# Patient Record
Sex: FEMALE | Race: WHITE | NOT HISPANIC OR LATINO | Employment: UNEMPLOYED | URBAN - METROPOLITAN AREA
[De-identification: names, ages, dates, MRNs, and addresses within clinical notes are randomized per-mention and may not be internally consistent; named-entity substitution may affect disease eponyms.]

---

## 2019-10-14 ENCOUNTER — OFFICE VISIT CONVERTED (OUTPATIENT)
Dept: PLASTIC SURGERY | Facility: CLINIC | Age: 74
End: 2019-10-14
Attending: PLASTIC SURGERY

## 2019-10-14 ENCOUNTER — CONVERSION ENCOUNTER (OUTPATIENT)
Dept: PLASTIC SURGERY | Facility: CLINIC | Age: 74
End: 2019-10-14

## 2019-11-05 ENCOUNTER — HOSPITAL ENCOUNTER (OUTPATIENT)
Dept: PERIOP | Facility: HOSPITAL | Age: 74
Setting detail: HOSPITAL OUTPATIENT SURGERY
Discharge: HOME | End: 2019-11-05
Attending: PLASTIC SURGERY

## 2019-11-11 ENCOUNTER — OFFICE VISIT CONVERTED (OUTPATIENT)
Dept: PLASTIC SURGERY | Facility: CLINIC | Age: 74
End: 2019-11-11
Attending: PLASTIC SURGERY

## 2019-11-18 ENCOUNTER — OFFICE VISIT CONVERTED (OUTPATIENT)
Dept: PLASTIC SURGERY | Facility: CLINIC | Age: 74
End: 2019-11-18
Attending: PLASTIC SURGERY

## 2019-12-20 ENCOUNTER — CONVERSION ENCOUNTER (OUTPATIENT)
Dept: PLASTIC SURGERY | Facility: CLINIC | Age: 74
End: 2019-12-20

## 2019-12-20 ENCOUNTER — OFFICE VISIT CONVERTED (OUTPATIENT)
Dept: PLASTIC SURGERY | Facility: CLINIC | Age: 74
End: 2019-12-20
Attending: PLASTIC SURGERY

## 2021-05-15 VITALS
OXYGEN SATURATION: 96 % | WEIGHT: 176 LBS | SYSTOLIC BLOOD PRESSURE: 152 MMHG | BODY MASS INDEX: 32.39 KG/M2 | HEIGHT: 62 IN | DIASTOLIC BLOOD PRESSURE: 81 MMHG | HEART RATE: 80 BPM

## 2021-05-15 VITALS
BODY MASS INDEX: 32.46 KG/M2 | DIASTOLIC BLOOD PRESSURE: 82 MMHG | HEART RATE: 74 BPM | WEIGHT: 176.37 LBS | OXYGEN SATURATION: 98 % | HEIGHT: 62 IN | SYSTOLIC BLOOD PRESSURE: 147 MMHG

## 2021-05-15 VITALS
BODY MASS INDEX: 32.39 KG/M2 | SYSTOLIC BLOOD PRESSURE: 151 MMHG | HEART RATE: 83 BPM | DIASTOLIC BLOOD PRESSURE: 84 MMHG | HEIGHT: 62 IN | WEIGHT: 176 LBS | OXYGEN SATURATION: 96 %

## 2021-05-15 VITALS
HEART RATE: 81 BPM | HEIGHT: 62 IN | BODY MASS INDEX: 32.04 KG/M2 | OXYGEN SATURATION: 96 % | SYSTOLIC BLOOD PRESSURE: 159 MMHG | DIASTOLIC BLOOD PRESSURE: 82 MMHG | WEIGHT: 174.12 LBS

## 2022-02-03 ENCOUNTER — TELEPHONE (OUTPATIENT)
Dept: UROLOGY | Facility: CLINIC | Age: 77
End: 2022-02-03

## 2022-02-03 ENCOUNTER — OFFICE VISIT (OUTPATIENT)
Dept: FAMILY MEDICINE CLINIC | Facility: CLINIC | Age: 77
End: 2022-02-03

## 2022-02-03 VITALS
DIASTOLIC BLOOD PRESSURE: 102 MMHG | BODY MASS INDEX: 32.33 KG/M2 | HEART RATE: 77 BPM | RESPIRATION RATE: 20 BRPM | OXYGEN SATURATION: 97 % | TEMPERATURE: 97.2 F | SYSTOLIC BLOOD PRESSURE: 208 MMHG | WEIGHT: 175.7 LBS | HEIGHT: 62 IN

## 2022-02-03 DIAGNOSIS — I10 PRIMARY HYPERTENSION: ICD-10-CM

## 2022-02-03 DIAGNOSIS — Z86.010 HISTORY OF COLON POLYPS: ICD-10-CM

## 2022-02-03 DIAGNOSIS — Z00.00 MEDICARE ANNUAL WELLNESS VISIT, INITIAL: Primary | ICD-10-CM

## 2022-02-03 DIAGNOSIS — Z11.59 ENCOUNTER FOR HEPATITIS C SCREENING TEST FOR LOW RISK PATIENT: ICD-10-CM

## 2022-02-03 PROBLEM — C44.91 BASAL CELL CARCINOMA: Status: ACTIVE | Noted: 2022-02-03

## 2022-02-03 PROBLEM — M19.90 ARTHRITIS: Status: ACTIVE | Noted: 2022-02-03

## 2022-02-03 LAB
ALBUMIN SERPL-MCNC: 4.3 G/DL (ref 3.5–5.2)
ALBUMIN/GLOB SERPL: 1.5 G/DL
ALP SERPL-CCNC: 94 U/L (ref 39–117)
ALT SERPL W P-5'-P-CCNC: 16 U/L (ref 1–33)
ANION GAP SERPL CALCULATED.3IONS-SCNC: 10.7 MMOL/L (ref 5–15)
AST SERPL-CCNC: 18 U/L (ref 1–32)
BASOPHILS # BLD AUTO: 0.04 10*3/MM3 (ref 0–0.2)
BASOPHILS NFR BLD AUTO: 0.7 % (ref 0–1.5)
BILIRUB SERPL-MCNC: 0.3 MG/DL (ref 0–1.2)
BUN SERPL-MCNC: 13 MG/DL (ref 8–23)
BUN/CREAT SERPL: 17.8 (ref 7–25)
CALCIUM SPEC-SCNC: 9.6 MG/DL (ref 8.6–10.5)
CHLORIDE SERPL-SCNC: 104 MMOL/L (ref 98–107)
CHOLEST SERPL-MCNC: 209 MG/DL (ref 0–200)
CO2 SERPL-SCNC: 25.3 MMOL/L (ref 22–29)
CREAT SERPL-MCNC: 0.73 MG/DL (ref 0.57–1)
DEPRECATED RDW RBC AUTO: 44.4 FL (ref 37–54)
EOSINOPHIL # BLD AUTO: 0.04 10*3/MM3 (ref 0–0.4)
EOSINOPHIL NFR BLD AUTO: 0.7 % (ref 0.3–6.2)
ERYTHROCYTE [DISTWIDTH] IN BLOOD BY AUTOMATED COUNT: 13.2 % (ref 12.3–15.4)
GFR SERPL CREATININE-BSD FRML MDRD: 78 ML/MIN/1.73
GLOBULIN UR ELPH-MCNC: 2.9 GM/DL
GLUCOSE SERPL-MCNC: 98 MG/DL (ref 65–99)
HCT VFR BLD AUTO: 42 % (ref 34–46.6)
HDLC SERPL-MCNC: 54 MG/DL (ref 40–60)
HGB BLD-MCNC: 13.9 G/DL (ref 12–15.9)
IMM GRANULOCYTES # BLD AUTO: 0.02 10*3/MM3 (ref 0–0.05)
IMM GRANULOCYTES NFR BLD AUTO: 0.3 % (ref 0–0.5)
LDLC SERPL CALC-MCNC: 126 MG/DL (ref 0–100)
LDLC/HDLC SERPL: 2.27 {RATIO}
LYMPHOCYTES # BLD AUTO: 2.21 10*3/MM3 (ref 0.7–3.1)
LYMPHOCYTES NFR BLD AUTO: 38.3 % (ref 19.6–45.3)
MCH RBC QN AUTO: 30.2 PG (ref 26.6–33)
MCHC RBC AUTO-ENTMCNC: 33.1 G/DL (ref 31.5–35.7)
MCV RBC AUTO: 91.3 FL (ref 79–97)
MONOCYTES # BLD AUTO: 0.43 10*3/MM3 (ref 0.1–0.9)
MONOCYTES NFR BLD AUTO: 7.5 % (ref 5–12)
NEUTROPHILS NFR BLD AUTO: 3.03 10*3/MM3 (ref 1.7–7)
NEUTROPHILS NFR BLD AUTO: 52.5 % (ref 42.7–76)
NRBC BLD AUTO-RTO: 0 /100 WBC (ref 0–0.2)
PLATELET # BLD AUTO: 258 10*3/MM3 (ref 140–450)
PMV BLD AUTO: 9.7 FL (ref 6–12)
POTASSIUM SERPL-SCNC: 4.6 MMOL/L (ref 3.5–5.2)
PROT SERPL-MCNC: 7.2 G/DL (ref 6–8.5)
RBC # BLD AUTO: 4.6 10*6/MM3 (ref 3.77–5.28)
SODIUM SERPL-SCNC: 140 MMOL/L (ref 136–145)
TRIGL SERPL-MCNC: 163 MG/DL (ref 0–150)
TSH SERPL DL<=0.05 MIU/L-ACNC: 3.97 UIU/ML (ref 0.27–4.2)
VLDLC SERPL-MCNC: 29 MG/DL (ref 5–40)
WBC NRBC COR # BLD: 5.77 10*3/MM3 (ref 3.4–10.8)

## 2022-02-03 PROCEDURE — G0438 PPPS, INITIAL VISIT: HCPCS | Performed by: NURSE PRACTITIONER

## 2022-02-03 PROCEDURE — 99203 OFFICE O/P NEW LOW 30 MIN: CPT | Performed by: NURSE PRACTITIONER

## 2022-02-03 PROCEDURE — 80061 LIPID PANEL: CPT | Performed by: NURSE PRACTITIONER

## 2022-02-03 PROCEDURE — 80050 GENERAL HEALTH PANEL: CPT | Performed by: NURSE PRACTITIONER

## 2022-02-03 RX ORDER — LISINOPRIL AND HYDROCHLOROTHIAZIDE 20; 12.5 MG/1; MG/1
1 TABLET ORAL DAILY
Qty: 30 TABLET | Refills: 1 | Status: SHIPPED | OUTPATIENT
Start: 2022-02-03 | End: 2022-03-03 | Stop reason: SDUPTHER

## 2022-02-03 NOTE — PATIENT INSTRUCTIONS
Advance Directive    Advance directives are legal documents that let you make choices ahead of time about your health care and medical treatment in case you become unable to communicate for yourself. Advance directives are a way for you to make known your wishes to family, friends, and health care providers. This can let others know about your end-of-life care if you become unable to communicate.  Discussing and writing advance directives should happen over time rather than all at once. Advance directives can be changed depending on your situation and what you want, even after you have signed the advance directives.  There are different types of advance directives, such as:  · Medical power of .  · Living will.  · Do not resuscitate (DNR) or do not attempt resuscitation (DNAR) order.  Health care proxy and medical power of   A health care proxy is also called a health care agent. This is a person who is appointed to make medical decisions for you in cases where you are unable to make the decisions yourself. Generally, people choose someone they know well and trust to represent their preferences. Make sure to ask this person for an agreement to act as your proxy. A proxy may have to exercise judgment in the event of a medical decision for which your wishes are not known.  A medical power of  is a legal document that names your health care proxy. Depending on the laws in your state, after the document is written, it may also need to be:  · Signed.  · Notarized.  · Dated.  · Copied.  · Witnessed.  · Incorporated into your medical record.  You may also want to appoint someone to manage your money in a situation in which you are unable to do so. This is called a durable power of  for finances. It is a separate legal document from the durable power of  for health care. You may choose the same person or someone different from your health care proxy to act as your agent in money  matters.  If you do not appoint a proxy, or if there is a concern that the proxy is not acting in your best interests, a court may appoint a guardian to act on your behalf.  Living will  A living will is a set of instructions that state your wishes about medical care when you cannot express them yourself. Health care providers should keep a copy of your living will in your medical record. You may want to give a copy to family members or friends. To alert caregivers in case of an emergency, you can place a card in your wallet to let them know that you have a living will and where they can find it. A living will is used if you become:  · Terminally ill.  · Disabled.  · Unable to communicate or make decisions.  Items to consider in your living will include:  · To use or not to use life-support equipment, such as dialysis machines and breathing machines (ventilators).  · A DNR or DNAR order. This tells health care providers not to use cardiopulmonary resuscitation (CPR) if breathing or heartbeat stops.  · To use or not to use tube feeding.  · To be given or not to be given food and fluids.  · Comfort (palliative) care when the goal becomes comfort rather than a cure.  · Donation of organs and tissues.  A living will does not give instructions for distributing your money and property if you should pass away.  DNR or DNAR  A DNR or DNAR order is a request not to have CPR in the event that your heart stops beating or you stop breathing. If a DNR or DNAR order has not been made and shared, a health care provider will try to help any patient whose heart has stopped or who has stopped breathing. If you plan to have surgery, talk with your health care provider about how your DNR or DNAR order will be followed if problems occur.  What if I do not have an advance directive?  If you do not have an advance directive, some states assign family decision makers to act on your behalf based on how closely you are related to them. Each  state has its own laws about advance directives. You may want to check with your health care provider, , or state representative about the laws in your state.  Summary  · Advance directives are the legal documents that allow you to make choices ahead of time about your health care and medical treatment in case you become unable to tell others about your care.  · The process of discussing and writing advance directives should happen over time. You can change the advance directives, even after you have signed them.  · Advance directives include DNR or DNAR orders, living whitney, and designating an agent as your medical power of .  This information is not intended to replace advice given to you by your health care provider. Make sure you discuss any questions you have with your health care provider.  Document Revised: 01/28/2021 Document Reviewed: 07/16/2020  Elsevier Patient Education © 2021 [employer]

## 2022-02-03 NOTE — TELEPHONE ENCOUNTER
ATTEMPTED TO CALL PT TO [facility] [place] W/ APRIL FOR A COLON CONSULT/LMOM

## 2022-02-03 NOTE — PROGRESS NOTES
The ABCs of the Annual Wellness Visit  Initial Medicare Wellness Visit    Chief Complaint   Patient presents with   • Establish Care     HTN     Subjective   History of Present Illness:  Estelle Gutierrez is a 76 y.o. female who presents for an Initial Medicare Wellness Visit.    The following portions of the patient's history were reviewed and   updated as appropriate: allergies, current medications, past family history, past medical history, past social history, past surgical history and problem list.     Compared to one year ago, the patient feels her physical   health is the same.    Compared to one year ago, the patient feels her mental   health is the same.    Recent Hospitalizations:  She was not admitted to the hospital during the last year.       Current Medical Providers:  Patient Care Team:  Karuna Rivers APRN as PCP - General (Nurse Practitioner)    No outpatient medications prior to visit.     No facility-administered medications prior to visit.       No opioid medication identified on active medication list. I have reviewed chart for other potential  high risk medication/s and harmful drug interactions in the elderly.          Aspirin is not on active medication list.  Aspirin use is not indicated based on review of current medical condition/s. Risk of harm outweighs potential benefits.  .    Patient Active Problem List   Diagnosis   • Arthritis   • Basal cell carcinoma   • Primary hypertension     Advance Care Planning  Advance Directive is not on file.  ACP discussion was declined by the patient. Patient does not have an advance directive, information provided.    Review of Systems   Constitutional: Negative for fatigue.   Respiratory: Negative for cough and shortness of breath.    Cardiovascular: Negative for chest pain and leg swelling.   Gastrointestinal: Negative for nausea and vomiting.   Psychiatric/Behavioral: Negative for hallucinations and suicidal ideas.        Objective       Vitals:  "   22 0800 22 0809   BP: (!) 218/76 (!) 208/102   BP Location: Left arm Right arm   Patient Position: Sitting Sitting   Cuff Size: Adult Large Adult   Pulse: 77    Resp: 20    Temp: 97.2 °F (36.2 °C)    TempSrc: Tympanic    SpO2: 97%    Weight: 79.7 kg (175 lb 11.2 oz)    Height: 157.5 cm (62\")      BMI Readings from Last 1 Encounters:   22 32.14 kg/m²   BMI is above normal parameters. Recommendations include: exercise counseling and nutrition counseling    Does the patient have evidence of cognitive impairment? No    Physical Exam  Vitals reviewed.   Constitutional:       Appearance: Normal appearance. She is well-developed.   Cardiovascular:      Rate and Rhythm: Normal rate and regular rhythm.      Heart sounds: Murmur heard.       Pulmonary:      Effort: Pulmonary effort is normal.      Breath sounds: Normal breath sounds.   Musculoskeletal:      Right lower leg: No edema.      Left lower leg: No edema.   Neurological:      Mental Status: She is alert and oriented to person, place, and time.      Cranial Nerves: No cranial nerve deficit.      Motor: No weakness.   Psychiatric:         Mood and Affect: Mood and affect normal.                 HEALTH RISK ASSESSMENT    Smoking Status:  Social History     Tobacco Use   Smoking Status Former Smoker   • Packs/day: 1.00   • Years: 36.00   • Pack years: 36.00   • Types: Cigarettes   • Quit date:    • Years since quittin.1   Smokeless Tobacco Never Used     Alcohol Consumption:  Social History     Substance and Sexual Activity   Alcohol Use Not Currently     Fall Risk Screen:    JONAHADI Fall Risk Assessment was completed, and patient is at LOW risk for falls.Assessment completed on:2/3/2022    Depression Screen:   PHQ-2/PHQ-9 Depression Screening 2/3/2022   Little interest or pleasure in doing things 0   Feeling down, depressed, or hopeless 0   Total Score 0       Health Habits and Functional and Cognitive Screening:  Functional & Cognitive " Status 2/3/2022   Do you have difficulty preparing food and eating? No   Do you have difficulty bathing yourself, getting dressed or grooming yourself? No   Do you have difficulty using the toilet? No   Do you have difficulty moving around from place to place? No   Do you have trouble with steps or getting out of a bed or a chair? No   Current Diet Well Balanced Diet   Dental Exam Not up to date   Eye Exam Up to date   Exercise (times per week) 0 times per week   Current Exercises Include Walking   Do you need help using the phone?  No   Are you deaf or do you have serious difficulty hearing?  Yes   Do you need help with transportation? Yes   Do you need help shopping? No   Do you need help preparing meals?  No   Do you need help with housework?  No   Do you need help with laundry? No   Do you need help taking your medications? No   Do you need help managing money? No   Do you ever drive or ride in a car without wearing a seat belt? No       Age-appropriate Screening Schedule:  Refer to the list below for future screening recommendations based on patient's age, sex and/or medical conditions. Orders for these recommended tests are listed in the plan section. The patient has been provided with a written plan.    Health Maintenance   Topic Date Due   • TDAP/TD VACCINES (1 - Tdap) Never done   • ZOSTER VACCINE (1 of 2) Never done   • DXA SCAN  02/03/2022 (Originally 1945)   • INFLUENZA VACCINE  Completed            Assessment/Plan   CMS Preventative Services Quick Reference  Risk Factors Identified During Encounter  Cardiovascular Disease  Chronic Pain   Depression/Dysphoria  Fall Risk-High or Moderate  Immunizations Discussed/Encouraged (specific Immunizations; Tdap and Shingrix  Inactivity/Sedentary  The above risks/problems have been discussed with the patient.  Follow up actions/plans if indicated are seen below in the Assessment/Plan Section.  Pertinent information has been shared with the patient in the After  Visit Summary.    Diagnoses and all orders for this visit:    1. Medicare annual wellness visit, initial (Primary)    2. Primary hypertension  -     Comprehensive Metabolic Panel  -     CBC & Differential  -     TSH  -     Lipid Panel  -     lisinopril-hydrochlorothiazide (PRINZIDE,ZESTORETIC) 20-12.5 MG per tablet; Take 1 tablet by mouth Daily.  Dispense: 30 tablet; Refill: 1    3. Encounter for hepatitis C screening test for low risk patient  -     Hepatitis C Antibody    4. History of colon polyps  -     Ambulatory Referral to General Surgery        Follow Up:  Return in about 4 weeks (around 3/3/2022).   We will follow up in 1 month due to the elevation of blood pressure.  We will start lisinopril hydrochlorothiazide since the blood pressure is extremely high.  She will check at home with the home monitoring.  She will watch her diet with the amount of sodium.  We discussed the risk factors with high blood pressure.  An After Visit Summary and PPPS were made available to the patient.               Karuna Rivers, APRN  02/03/2022      Chief Complaint  Establish Care (HTN)    Subjective    History of Present Illness    Estelle Gutierrez presents for Annual Wellness Visit as well as for follow-up on chronic medical problems including hypertension.   She went to the eye doctor recently and they said her blood pressure was up at their appointment.  She has not been on any medications.  She states that she is not having any chest pain shortness of breath dizziness or lightheadedness noted.  She has been checking at home and it has been running 150's/70's.    She has not had any lab work done.    Result Review :              We will follow up in 1 month due to the elevation of blood pressure.  We will start lisinopril hydrochlorothiazide since the blood pressure is extremely high.  She will check at home with the home monitoring.  She will watch her diet with the amount of sodium.  We discussed the risk factors with  high blood pressure.    She does have a slt murmur and it could be related to the blood pressure and if it is still there in 1 month with better control of the BP we will do u/s of the heart.  Time with pt overall going over chart, history, adding labs and consults 32 mins total.  Karuna Rivers, APRN  02/03/2022

## 2022-02-07 NOTE — TELEPHONE ENCOUNTER
3RD ATTEMPT TO REACH PT NO ANSWER LMOM, SENDING BACK TO HUB TO NOTIFY REFERRING PROVIDER THAT WE ARE UNABLE TO REACH PT.

## 2022-03-03 ENCOUNTER — OFFICE VISIT (OUTPATIENT)
Dept: FAMILY MEDICINE CLINIC | Facility: CLINIC | Age: 77
End: 2022-03-03

## 2022-03-03 VITALS
HEART RATE: 71 BPM | SYSTOLIC BLOOD PRESSURE: 128 MMHG | DIASTOLIC BLOOD PRESSURE: 72 MMHG | HEIGHT: 62 IN | OXYGEN SATURATION: 97 % | TEMPERATURE: 97 F | WEIGHT: 173.8 LBS | RESPIRATION RATE: 16 BRPM | BODY MASS INDEX: 31.98 KG/M2

## 2022-03-03 DIAGNOSIS — E78.2 MIXED HYPERLIPIDEMIA: ICD-10-CM

## 2022-03-03 DIAGNOSIS — R01.1 MURMUR: ICD-10-CM

## 2022-03-03 DIAGNOSIS — Z78.0 POSTMENOPAUSAL: ICD-10-CM

## 2022-03-03 DIAGNOSIS — I10 PRIMARY HYPERTENSION: Primary | ICD-10-CM

## 2022-03-03 PROCEDURE — 99213 OFFICE O/P EST LOW 20 MIN: CPT | Performed by: NURSE PRACTITIONER

## 2022-03-03 RX ORDER — LISINOPRIL AND HYDROCHLOROTHIAZIDE 20; 12.5 MG/1; MG/1
1 TABLET ORAL DAILY
Qty: 90 TABLET | Refills: 0 | Status: SHIPPED | OUTPATIENT
Start: 2022-03-03 | End: 2022-05-26 | Stop reason: SDUPTHER

## 2022-03-03 NOTE — PROGRESS NOTES
"Chief Complaint  Hypertension and Follow-up    Subjective          Estelle Gutierrez presents to [facility]  History of Present Illness    She started with the treadmill 2 miles a day about 2 weeks ago to help the chol.  She is taking her meds daily.    She feels good.          Allergies  Shellfish-derived products and Other    Social History     Tobacco Use   • Smoking status: Former Smoker     Packs/day: 1.00     Years: 36.00     Pack years: 36.00     Types: Cigarettes     Quit date:      Years since quittin.1   • Smokeless tobacco: Never Used   Vaping Use   • Vaping Use: Never used   Substance Use Topics   • Alcohol use: Not Currently   • Drug use: Never       Family History   Problem Relation Age of Onset   • Stroke Mother         90 yr old   • Heart disease Mother    • Heart disease Father    • Other Father         heart attack death   • Heart disease Brother         Health Maintenance Due   Topic Date Due   • DXA SCAN  Never done   • TDAP/TD VACCINES (1 - Tdap) Never done   • ZOSTER VACCINE (1 of 2) Never done   • HEPATITIS C SCREENING  Never done        Immunization History   Administered Date(s) Administered   • COVID-19 (PFIZER) PURPLE CAP 2021, 2021, 2021       Review of Systems   Constitutional: Negative for fatigue.   Respiratory: Negative for cough and shortness of breath.    Cardiovascular: Negative for chest pain.   Gastrointestinal: Negative for diarrhea, nausea and vomiting.        Objective       Vitals:    22 1008   BP: 128/72   Pulse: 71   Resp: 16   Temp: 97 °F (36.1 °C)   SpO2: 97%   Weight: 78.8 kg (173 lb 12.8 oz)   Height: 157.5 cm (62\")       Body mass index is 31.79 kg/m².         Physical Exam  Vitals reviewed.   Constitutional:       Appearance: Normal appearance. She is well-developed.   Cardiovascular:      Rate and Rhythm: Normal rate and regular rhythm.      Heart sounds: Murmur heard.       Pulmonary:      Effort: " Pulmonary effort is normal.      Breath sounds: Normal breath sounds.   Neurological:      Mental Status: She is alert and oriented to person, place, and time.      Cranial Nerves: No cranial nerve deficit.      Motor: No weakness.   Psychiatric:         Mood and Affect: Mood and affect normal.             Result Review :     The following data was reviewed by: CECILIA Goode on 03/03/2022:    Common labs    Common Labsle 2/3/22 2/3/22 2/3/22    0831 0831 0831   Glucose  98    BUN  13    Creatinine  0.73    eGFR Non African Am  78    Sodium  140    Potassium  4.6    Chloride  104    Calcium  9.6    Albumin  4.30    Total Bilirubin  0.3    Alkaline Phosphatase  94    AST (SGOT)  18    ALT (SGPT)  16    WBC 5.77     Hemoglobin 13.9     Hematocrit 42.0     Platelets 258     Total Cholesterol   209 (A)   Triglycerides   163 (A)   HDL Cholesterol   54   LDL Cholesterol    126 (A)   (A) Abnormal value                           Assessment and Plan      Diagnoses and all orders for this visit:    1. Primary hypertension (Primary)  -     lisinopril-hydrochlorothiazide (PRINZIDE,ZESTORETIC) 20-12.5 MG per tablet; Take 1 tablet by mouth Daily.  Dispense: 90 tablet; Refill: 0    2. Postmenopausal  -     DEXA Bone Density Axial; Future    3. Murmur    4. Mixed hyperlipidemia            Follow Up     Return in about 3 months (around 6/3/2022).  We will f.u in 3 months for fasting labs and Appointment.  Cont the great work with the BP.  For the murmur we will cont to monitor as it is not as loud as before.    Patient was given instructions and counseling regarding her condition or for health maintenance advice. Please see specific information pulled into the AVS if appropriate.         CECILIA Goode  03/03/2022

## 2022-05-19 ENCOUNTER — PREP FOR SURGERY (OUTPATIENT)
Dept: OTHER | Facility: HOSPITAL | Age: 77
End: 2022-05-19

## 2022-05-19 ENCOUNTER — OFFICE VISIT (OUTPATIENT)
Dept: SURGERY | Facility: CLINIC | Age: 77
End: 2022-05-19

## 2022-05-19 VITALS — RESPIRATION RATE: 18 BRPM | WEIGHT: 174 LBS | HEIGHT: 62 IN | HEART RATE: 71 BPM | BODY MASS INDEX: 32.02 KG/M2

## 2022-05-19 DIAGNOSIS — Z86.010 HISTORY OF COLONIC POLYPS: ICD-10-CM

## 2022-05-19 DIAGNOSIS — Z12.11 SCREENING FOR MALIGNANT NEOPLASM OF COLON: Primary | ICD-10-CM

## 2022-05-19 PROBLEM — Z86.0100 HISTORY OF COLONIC POLYPS: Status: ACTIVE | Noted: 2022-05-19

## 2022-05-19 PROCEDURE — S0285 CNSLT BEFORE SCREEN COLONOSC: HCPCS | Performed by: NURSE PRACTITIONER

## 2022-05-19 RX ORDER — SODIUM CHLORIDE 0.9 % (FLUSH) 0.9 %
3 SYRINGE (ML) INJECTION EVERY 12 HOURS SCHEDULED
Status: CANCELLED | OUTPATIENT
Start: 2022-05-19

## 2022-05-19 RX ORDER — POLYETHYLENE GLYCOL 3350 17 G/17G
POWDER, FOR SOLUTION ORAL
Qty: 238 PACKET | Refills: 0 | Status: ON HOLD | OUTPATIENT
Start: 2022-05-19 | End: 2022-07-28

## 2022-05-19 RX ORDER — SODIUM CHLORIDE 0.9 % (FLUSH) 0.9 %
10 SYRINGE (ML) INJECTION AS NEEDED
Status: CANCELLED | OUTPATIENT
Start: 2022-05-19

## 2022-05-19 NOTE — PROGRESS NOTES
Chief Complaint: Colonoscopy (consult)    Subjective      Colonoscopy consultation       History of Present Illness  Estelle Gutierrez is a 76 y.o. female presents to [facility] for colonoscopy consultation.    Patient presents today on referral from Mirna De Jesus for colonoscopy consultation.    Patient denies any abdominal pain, change in bowel habit, rectal bleeding.    Patient denies any family history of colorectal cancer.    Admits to history of colonic polyps.    11/15: Colonoscopy ([facility]): Rectum - hyperplatsic    Objective     Past Medical History:   Diagnosis Date   • Arthritis    • Cataract 01/15/2022    right       Past Surgical History:   Procedure Laterality Date   • HYSTERECTOMY      total   • KNEE CARTILAGE SURGERY Left    • SKIN GRAFT  2019    nose   • TONSILLECTOMY         Outpatient Medications Marked as Taking for the 22 encounter (Office Visit) with Loly Leong APRN   Medication Sig Dispense Refill   • lisinopril-hydrochlorothiazide (PRINZIDE,ZESTORETIC) 20-12.5 MG per tablet Take 1 tablet by mouth Daily. 90 tablet 0       Allergies   Allergen Reactions   • Shellfish-Derived Products Unknown - Low Severity   • Other Rash     Fertilizer on farm        Family History   Problem Relation Age of Onset   • Stroke Mother         90 yr old   • Heart disease Mother    • Heart disease Father    • Other Father         heart attack death   • Heart disease Brother        Social History     Socioeconomic History   • Marital status:    Tobacco Use   • Smoking status: Former Smoker     Packs/day: 1.00     Years: 36.00     Pack years: 36.00     Types: Cigarettes     Quit date:      Years since quittin.4   • Smokeless tobacco: Never Used   Vaping Use   • Vaping Use: Never used   Substance and Sexual Activity   • Alcohol use: Not Currently   • Drug use: Never   • Sexual activity: Defer       Review of Systems   Constitutional: Negative for chills and  "fever.   Gastrointestinal: Negative for abdominal distention, abdominal pain, anal bleeding, blood in stool, constipation, diarrhea and rectal pain.        Vital Signs:   Pulse 71   Resp 18   Ht 157.5 cm (62\")   Wt 78.9 kg (174 lb)   BMI 31.83 kg/m²      Physical Exam  Constitutional:       Appearance: Normal appearance.   HENT:      Head: Normocephalic.   Cardiovascular:      Rate and Rhythm: Normal rate.   Pulmonary:      Effort: Pulmonary effort is normal.   Abdominal:      General: Abdomen is flat.      Palpations: Abdomen is soft.   Skin:     General: Skin is warm and dry.   Neurological:      General: No focal deficit present.      Mental Status: She is alert and oriented to person, place, and time.   Psychiatric:         Mood and Affect: Mood normal.         Thought Content: Thought content normal.          Result Review :              []  Laboratory  []  Radiology  [x]  Pathology  []  Microbiology  []  EKG/Telemetry   []  Cardiology/Vascular   [x]  Old records  Today have reviewed Dr. Newell's previous colonoscopy and pathology.     Assessment and Plan    Diagnoses and all orders for this visit:    1. Screening for malignant neoplasm of colon (Primary)    2. History of colonic polyps    Other orders  -     polyethylene glycol (MIRALAX) 17 g packet; Take as directed.  Instructions given in office.  Dispense: 238 g bottle  Dispense: 238 packet; Refill: 0    White prep    Follow Up   Return for Schedule colonoscopy with Dr. Owens on 7/21/2022 at [facility].     Hospital arrival time: 0900    Possible risks/complications, benefits, and alternatives to surgical or invasive procedure have been explained to patient and/or legal guardian.     Patient has been evaluated and can tolerate anesthesia and/or sedation. Risks, benefits, and alternatives to anesthesia and sedation have been explained to patient and/or legal guardian.  Patient verbalizes understanding is will proceed with above " plan.    Patient was given instructions and counseling regarding her condition or for health maintenance advice. Please see specific information pulled into the AVS if appropriate.

## 2022-05-20 DIAGNOSIS — I10 PRIMARY HYPERTENSION: ICD-10-CM

## 2022-05-23 RX ORDER — LISINOPRIL AND HYDROCHLOROTHIAZIDE 20; 12.5 MG/1; MG/1
TABLET ORAL
Qty: 90 TABLET | Refills: 0 | OUTPATIENT
Start: 2022-05-23

## 2022-05-26 ENCOUNTER — OFFICE VISIT (OUTPATIENT)
Dept: FAMILY MEDICINE CLINIC | Facility: CLINIC | Age: 77
End: 2022-05-26

## 2022-05-26 ENCOUNTER — HOSPITAL ENCOUNTER (OUTPATIENT)
Dept: BONE DENSITY | Facility: HOSPITAL | Age: 77
Discharge: HOME | End: 2022-05-26
Admitting: NURSE PRACTITIONER

## 2022-05-26 VITALS
HEART RATE: 72 BPM | BODY MASS INDEX: 32.41 KG/M2 | WEIGHT: 176.1 LBS | TEMPERATURE: 97 F | OXYGEN SATURATION: 96 % | SYSTOLIC BLOOD PRESSURE: 124 MMHG | RESPIRATION RATE: 20 BRPM | DIASTOLIC BLOOD PRESSURE: 64 MMHG | HEIGHT: 62 IN

## 2022-05-26 DIAGNOSIS — I10 PRIMARY HYPERTENSION: Primary | ICD-10-CM

## 2022-05-26 DIAGNOSIS — E78.2 MIXED HYPERLIPIDEMIA: ICD-10-CM

## 2022-05-26 DIAGNOSIS — Z78.0 POSTMENOPAUSAL: ICD-10-CM

## 2022-05-26 DIAGNOSIS — Z11.59 ENCOUNTER FOR HEPATITIS C SCREENING TEST FOR LOW RISK PATIENT: ICD-10-CM

## 2022-05-26 LAB
BASOPHILS # BLD AUTO: 0.04 10*3/MM3 (ref 0–0.2)
BASOPHILS NFR BLD AUTO: 0.7 % (ref 0–1.5)
DEPRECATED RDW RBC AUTO: 44.2 FL (ref 37–54)
EOSINOPHIL # BLD AUTO: 0.06 10*3/MM3 (ref 0–0.4)
EOSINOPHIL NFR BLD AUTO: 1.1 % (ref 0.3–6.2)
ERYTHROCYTE [DISTWIDTH] IN BLOOD BY AUTOMATED COUNT: 13.2 % (ref 12.3–15.4)
HCT VFR BLD AUTO: 40.9 % (ref 34–46.6)
HGB BLD-MCNC: 13.3 G/DL (ref 12–15.9)
IMM GRANULOCYTES # BLD AUTO: 0.01 10*3/MM3 (ref 0–0.05)
IMM GRANULOCYTES NFR BLD AUTO: 0.2 % (ref 0–0.5)
LYMPHOCYTES # BLD AUTO: 2.26 10*3/MM3 (ref 0.7–3.1)
LYMPHOCYTES NFR BLD AUTO: 42.2 % (ref 19.6–45.3)
MCH RBC QN AUTO: 29.6 PG (ref 26.6–33)
MCHC RBC AUTO-ENTMCNC: 32.5 G/DL (ref 31.5–35.7)
MCV RBC AUTO: 90.9 FL (ref 79–97)
MONOCYTES # BLD AUTO: 0.52 10*3/MM3 (ref 0.1–0.9)
MONOCYTES NFR BLD AUTO: 9.7 % (ref 5–12)
NEUTROPHILS NFR BLD AUTO: 2.47 10*3/MM3 (ref 1.7–7)
NEUTROPHILS NFR BLD AUTO: 46.1 % (ref 42.7–76)
NRBC BLD AUTO-RTO: 0 /100 WBC (ref 0–0.2)
PLATELET # BLD AUTO: 267 10*3/MM3 (ref 140–450)
PMV BLD AUTO: 10 FL (ref 6–12)
RBC # BLD AUTO: 4.5 10*6/MM3 (ref 3.77–5.28)
WBC NRBC COR # BLD: 5.36 10*3/MM3 (ref 3.4–10.8)

## 2022-05-26 PROCEDURE — 80053 COMPREHEN METABOLIC PANEL: CPT | Performed by: NURSE PRACTITIONER

## 2022-05-26 PROCEDURE — 84443 ASSAY THYROID STIM HORMONE: CPT | Performed by: NURSE PRACTITIONER

## 2022-05-26 PROCEDURE — 86803 HEPATITIS C AB TEST: CPT | Performed by: NURSE PRACTITIONER

## 2022-05-26 PROCEDURE — 80061 LIPID PANEL: CPT | Performed by: NURSE PRACTITIONER

## 2022-05-26 PROCEDURE — 77080 DXA BONE DENSITY AXIAL: CPT

## 2022-05-26 PROCEDURE — 99214 OFFICE O/P EST MOD 30 MIN: CPT | Performed by: NURSE PRACTITIONER

## 2022-05-26 PROCEDURE — 85025 COMPLETE CBC W/AUTO DIFF WBC: CPT | Performed by: NURSE PRACTITIONER

## 2022-05-26 PROCEDURE — 36415 COLL VENOUS BLD VENIPUNCTURE: CPT | Performed by: NURSE PRACTITIONER

## 2022-05-26 RX ORDER — LISINOPRIL AND HYDROCHLOROTHIAZIDE 20; 12.5 MG/1; MG/1
1 TABLET ORAL DAILY
Qty: 90 TABLET | Refills: 1 | Status: SHIPPED | OUTPATIENT
Start: 2022-05-26 | End: 2022-11-22

## 2022-05-26 NOTE — PROGRESS NOTES
"Chief Complaint  Hypertension    Subjective          Estelle Gutierrez presents to [facility]  History of Present Illness  She is here for her refill of her medication.   She is doing well with her blood pressure medicine.  No chest pain shortness of breath.  She has been watching her diet with her cholesterol but she is not currently taking anything for cholesterol.  She is got her bone density's test this afternoon.      Allergies  Shellfish-derived products and Other    Social History     Tobacco Use   • Smoking status: Former Smoker     Packs/day: 1.00     Years: 36.00     Pack years: 36.00     Types: Cigarettes     Quit date:      Years since quittin.4   • Smokeless tobacco: Never Used   Vaping Use   • Vaping Use: Never used   Substance Use Topics   • Alcohol use: Not Currently   • Drug use: Never       Family History   Problem Relation Age of Onset   • Stroke Mother         90 yr old   • Heart disease Mother    • Heart disease Father    • Other Father         heart attack death   • Heart disease Brother         Health Maintenance Due   Topic Date Due   • DXA SCAN  Never done   • HEPATITIS C SCREENING  Never done        Immunization History   Administered Date(s) Administered   • COVID-19 (PFIZER) PURPLE CAP 2021, 2021, 2021       Review of Systems   Constitutional: Negative for fatigue.   Respiratory: Negative for cough and shortness of breath.    Cardiovascular: Negative for chest pain.   Gastrointestinal: Negative for diarrhea, nausea and vomiting.        Objective       Vitals:    22 1001 22 1020   BP: 140/78 124/64   Pulse: 72    Resp: 20    Temp: 97 °F (36.1 °C)    SpO2: 96%    Weight: 79.9 kg (176 lb 1.6 oz)    Height: 157.5 cm (62\")        Body mass index is 32.21 kg/m².         Physical Exam  Vitals reviewed.   Constitutional:       Appearance: Normal appearance. She is well-developed.   Cardiovascular:      Rate and Rhythm: Normal " rate and regular rhythm.      Heart sounds: Murmur heard.   Pulmonary:      Effort: Pulmonary effort is normal.      Breath sounds: Normal breath sounds.   Neurological:      Mental Status: She is alert and oriented to person, place, and time.      Cranial Nerves: No cranial nerve deficit.      Motor: No weakness.   Psychiatric:         Mood and Affect: Mood and affect normal.             Result Review :     The following data was reviewed by: CECILIA Goode on 05/26/2022:    Common labs    Common Labsle 2/3/22 2/3/22 2/3/22    0831 0831 0831   Glucose  98    BUN  13    Creatinine  0.73    eGFR Non African Am  78    Sodium  140    Potassium  4.6    Chloride  104    Calcium  9.6    Albumin  4.30    Total Bilirubin  0.3    Alkaline Phosphatase  94    AST (SGOT)  18    ALT (SGPT)  16    WBC 5.77     Hemoglobin 13.9     Hematocrit 42.0     Platelets 258     Total Cholesterol   209 (A)   Triglycerides   163 (A)   HDL Cholesterol   54   LDL Cholesterol    126 (A)   (A) Abnormal value                               Assessment and Plan      Diagnoses and all orders for this visit:    1. Primary hypertension (Primary)  -     Comprehensive Metabolic Panel  -     CBC & Differential  -     TSH  -     Lipid Panel  -     lisinopril-hydrochlorothiazide (PRINZIDE,ZESTORETIC) 20-12.5 MG per tablet; Take 1 tablet by mouth Daily.  Dispense: 90 tablet; Refill: 1    2. Encounter for hepatitis C screening test for low risk patient  -     Hepatitis C antibody    3. Mixed hyperlipidemia            Follow Up     Return in about 6 months (around 11/26/2022).  Follow-up in 6 months for labs and appt. Call with any concerns or questions that you may have regarding your medications or history.    I have reviewed all medications and at this time no medications changes need to be adjusted for all chronic conditions.    Patient was given instructions and counseling regarding her condition or for health maintenance advice. Please see  specific information pulled into the AVS if appropriate.         Karuna Rivers, APRN  05/26/2022

## 2022-05-27 LAB
ALBUMIN SERPL-MCNC: 3.8 G/DL (ref 3.5–5.2)
ALBUMIN/GLOB SERPL: 1.4 G/DL
ALP SERPL-CCNC: 79 U/L (ref 39–117)
ALT SERPL W P-5'-P-CCNC: 14 U/L (ref 1–33)
ANION GAP SERPL CALCULATED.3IONS-SCNC: 12.6 MMOL/L (ref 5–15)
AST SERPL-CCNC: 19 U/L (ref 1–32)
BILIRUB SERPL-MCNC: 0.3 MG/DL (ref 0–1.2)
BUN SERPL-MCNC: 9 MG/DL (ref 8–23)
BUN/CREAT SERPL: 13.2 (ref 7–25)
CALCIUM SPEC-SCNC: 9.2 MG/DL (ref 8.6–10.5)
CHLORIDE SERPL-SCNC: 104 MMOL/L (ref 98–107)
CHOLEST SERPL-MCNC: 192 MG/DL (ref 0–200)
CO2 SERPL-SCNC: 25.4 MMOL/L (ref 22–29)
CREAT SERPL-MCNC: 0.68 MG/DL (ref 0.57–1)
EGFRCR SERPLBLD CKD-EPI 2021: 90.4 ML/MIN/1.73
GLOBULIN UR ELPH-MCNC: 2.7 GM/DL
GLUCOSE SERPL-MCNC: 91 MG/DL (ref 65–99)
HCV AB SER DONR QL: NORMAL
HDLC SERPL-MCNC: 45 MG/DL (ref 40–60)
LDLC SERPL CALC-MCNC: 108 MG/DL (ref 0–100)
LDLC/HDLC SERPL: 2.26 {RATIO}
POTASSIUM SERPL-SCNC: 4.7 MMOL/L (ref 3.5–5.2)
PROT SERPL-MCNC: 6.5 G/DL (ref 6–8.5)
SODIUM SERPL-SCNC: 142 MMOL/L (ref 136–145)
TRIGL SERPL-MCNC: 226 MG/DL (ref 0–150)
TSH SERPL DL<=0.05 MIU/L-ACNC: 2.71 UIU/ML (ref 0.27–4.2)
VLDLC SERPL-MCNC: 39 MG/DL (ref 5–40)

## 2022-05-31 ENCOUNTER — TELEPHONE (OUTPATIENT)
Dept: FAMILY MEDICINE CLINIC | Facility: CLINIC | Age: 77
End: 2022-05-31

## 2022-05-31 NOTE — TELEPHONE ENCOUNTER
----- Message from CECILIA Goode sent at 5/30/2022 10:36 PM EDT -----  Chol is doing better overall.  Recheck in Nov keep up the great work

## 2022-06-01 ENCOUNTER — TELEPHONE (OUTPATIENT)
Dept: SURGERY | Facility: CLINIC | Age: 77
End: 2022-06-01

## 2022-06-01 NOTE — TELEPHONE ENCOUNTER
Called pt to inform her that I needed to r/s her procedure that is on for 7-21 due to dr being out. R/s pt to 7-28 with a 9:30 am arrival time. Pt voiced understanding.

## 2022-07-28 ENCOUNTER — HOSPITAL ENCOUNTER (OUTPATIENT)
Facility: HOSPITAL | Age: 77
Setting detail: HOSPITAL OUTPATIENT SURGERY
Discharge: HOME | End: 2022-07-28
Attending: SURGERY | Admitting: SURGERY

## 2022-07-28 ENCOUNTER — ANESTHESIA (OUTPATIENT)
Dept: GASTROENTEROLOGY | Facility: HOSPITAL | Age: 77
End: 2022-07-28

## 2022-07-28 ENCOUNTER — ANESTHESIA EVENT (OUTPATIENT)
Dept: GASTROENTEROLOGY | Facility: HOSPITAL | Age: 77
End: 2022-07-28

## 2022-07-28 VITALS
WEIGHT: 170.19 LBS | RESPIRATION RATE: 14 BRPM | HEART RATE: 74 BPM | TEMPERATURE: 98.6 F | DIASTOLIC BLOOD PRESSURE: 59 MMHG | SYSTOLIC BLOOD PRESSURE: 116 MMHG | BODY MASS INDEX: 31.32 KG/M2 | HEIGHT: 62 IN | OXYGEN SATURATION: 95 %

## 2022-07-28 DIAGNOSIS — Z12.11 SCREENING FOR MALIGNANT NEOPLASM OF COLON: ICD-10-CM

## 2022-07-28 DIAGNOSIS — Z86.010 HISTORY OF COLONIC POLYPS: ICD-10-CM

## 2022-07-28 PROCEDURE — 25010000002 PROPOFOL 10 MG/ML EMULSION: Performed by: NURSE ANESTHETIST, CERTIFIED REGISTERED

## 2022-07-28 RX ORDER — SODIUM CHLORIDE 0.9 % (FLUSH) 0.9 %
10 SYRINGE (ML) INJECTION AS NEEDED
Status: DISCONTINUED | OUTPATIENT
Start: 2022-07-28 | End: 2022-07-28 | Stop reason: HOSPADM

## 2022-07-28 RX ORDER — LIDOCAINE HYDROCHLORIDE 20 MG/ML
INJECTION, SOLUTION EPIDURAL; INFILTRATION; INTRACAUDAL; PERINEURAL AS NEEDED
Status: DISCONTINUED | OUTPATIENT
Start: 2022-07-28 | End: 2022-07-28 | Stop reason: SURG

## 2022-07-28 RX ORDER — SODIUM CHLORIDE 0.9 % (FLUSH) 0.9 %
3 SYRINGE (ML) INJECTION EVERY 12 HOURS SCHEDULED
Status: DISCONTINUED | OUTPATIENT
Start: 2022-07-28 | End: 2022-07-28 | Stop reason: HOSPADM

## 2022-07-28 RX ORDER — SODIUM CHLORIDE, SODIUM LACTATE, POTASSIUM CHLORIDE, CALCIUM CHLORIDE 600; 310; 30; 20 MG/100ML; MG/100ML; MG/100ML; MG/100ML
30 INJECTION, SOLUTION INTRAVENOUS CONTINUOUS
Status: DISCONTINUED | OUTPATIENT
Start: 2022-07-28 | End: 2022-07-28 | Stop reason: HOSPADM

## 2022-07-28 RX ORDER — PROPOFOL 10 MG/ML
VIAL (ML) INTRAVENOUS AS NEEDED
Status: DISCONTINUED | OUTPATIENT
Start: 2022-07-28 | End: 2022-07-28 | Stop reason: SURG

## 2022-07-28 RX ADMIN — LIDOCAINE HYDROCHLORIDE 40 MG: 20 INJECTION, SOLUTION EPIDURAL; INFILTRATION; INTRACAUDAL; PERINEURAL at 10:44

## 2022-07-28 RX ADMIN — PROPOFOL 200 MCG/KG/MIN: 10 INJECTION, EMULSION INTRAVENOUS at 10:44

## 2022-07-28 RX ADMIN — SODIUM CHLORIDE, POTASSIUM CHLORIDE, SODIUM LACTATE AND CALCIUM CHLORIDE 30 ML/HR: 600; 310; 30; 20 INJECTION, SOLUTION INTRAVENOUS at 10:16

## 2022-07-28 RX ADMIN — PROPOFOL 30 MG: 10 INJECTION, EMULSION INTRAVENOUS at 10:44

## 2022-07-28 NOTE — ANESTHESIA PREPROCEDURE EVALUATION
Anesthesia Evaluation     Patient summary reviewed and Nursing notes reviewed                Airway   Mallampati: I  TM distance: >3 FB  Neck ROM: full  No difficulty expected  Dental    (+) upper dentures    Pulmonary - negative pulmonary ROS and normal exam    breath sounds clear to auscultation  Cardiovascular - normal exam    Rhythm: regular  Rate: normal    (+) hypertension, valvular problems/murmurs,       Neuro/Psych- negative ROS  GI/Hepatic/Renal/Endo - negative ROS     Musculoskeletal     Abdominal    Substance History - negative use     OB/GYN negative ob/gyn ROS         Other   arthritis,    history of cancer                    Anesthesia Plan    ASA 2     general     intravenous induction     Anesthetic plan, risks, benefits, and alternatives have been provided, discussed and informed consent has been obtained with: patient.        CODE STATUS:

## 2022-07-28 NOTE — ANESTHESIA POSTPROCEDURE EVALUATION
Patient: Estelle Gutierrez    Procedure Summary     Date: 07/28/22 Room / Location: [facility] ENDOSCOPY 1 / [facility] ENDOSCOPY    Anesthesia Start: 1044 Anesthesia Stop: 1110    Procedure: COLONOSCOPY (N/A ) Diagnosis:       Screening for malignant neoplasm of colon      History of colonic polyps      (Screening for malignant neoplasm of colon [Z12.11])      (History of colonic polyps [Z86.010])    Surgeons: Mannie Owens MD Provider: Billy Ca MD    Anesthesia Type: general ASA Status: 2          Anesthesia Type: general    Vitals  Vitals Value Taken Time   /56 07/28/22 1115   Temp 36.8 °C (98.3 °F) 07/28/22 1115   Pulse 77 07/28/22 1118   Resp 15 07/28/22 1115   SpO2 97 % 07/28/22 1118   Vitals shown include unvalidated device data.        Post Anesthesia Care and Evaluation    Patient location during evaluation: bedside  Patient participation: complete - patient participated  Level of consciousness: awake  Pain management: adequate    Airway patency: patent  Anesthetic complications: No anesthetic complications  PONV Status: none  Cardiovascular status: acceptable  Respiratory status: acceptable  Hydration status: acceptable    Comments: An Anesthesiologist personally participated in the most demanding procedures (including induction and emergence if applicable) in the anesthesia plan, monitored the course of anesthesia administration at frequent intervals and remained physically present and available for immediate diagnosis and treatment of emergencies.

## 2022-08-27 PROCEDURE — 87077 CULTURE AEROBIC IDENTIFY: CPT | Performed by: PHYSICIAN ASSISTANT

## 2022-08-27 PROCEDURE — 87186 SC STD MICRODIL/AGAR DIL: CPT | Performed by: PHYSICIAN ASSISTANT

## 2022-08-27 PROCEDURE — 87086 URINE CULTURE/COLONY COUNT: CPT | Performed by: PHYSICIAN ASSISTANT

## 2022-09-29 PROCEDURE — 87186 SC STD MICRODIL/AGAR DIL: CPT | Performed by: PHYSICIAN ASSISTANT

## 2022-09-29 PROCEDURE — 87086 URINE CULTURE/COLONY COUNT: CPT | Performed by: PHYSICIAN ASSISTANT

## 2022-09-29 PROCEDURE — 87077 CULTURE AEROBIC IDENTIFY: CPT | Performed by: PHYSICIAN ASSISTANT

## 2022-11-22 DIAGNOSIS — I10 PRIMARY HYPERTENSION: ICD-10-CM

## 2022-11-22 RX ORDER — LISINOPRIL AND HYDROCHLOROTHIAZIDE 20; 12.5 MG/1; MG/1
TABLET ORAL
Qty: 30 TABLET | Refills: 0 | Status: SHIPPED | OUTPATIENT
Start: 2022-11-22 | End: 2022-11-29

## 2022-11-29 ENCOUNTER — OFFICE VISIT (OUTPATIENT)
Dept: FAMILY MEDICINE CLINIC | Facility: CLINIC | Age: 77
End: 2022-11-29

## 2022-11-29 VITALS
TEMPERATURE: 97.6 F | RESPIRATION RATE: 16 BRPM | HEART RATE: 85 BPM | OXYGEN SATURATION: 98 % | SYSTOLIC BLOOD PRESSURE: 124 MMHG | BODY MASS INDEX: 32.5 KG/M2 | HEIGHT: 62 IN | DIASTOLIC BLOOD PRESSURE: 80 MMHG | WEIGHT: 176.6 LBS

## 2022-11-29 DIAGNOSIS — N81.10 FALLEN BLADDER: ICD-10-CM

## 2022-11-29 DIAGNOSIS — E78.2 MIXED HYPERLIPIDEMIA: ICD-10-CM

## 2022-11-29 DIAGNOSIS — N32.89 BLADDER IRRITATION: ICD-10-CM

## 2022-11-29 DIAGNOSIS — I10 PRIMARY HYPERTENSION: Primary | ICD-10-CM

## 2022-11-29 PROCEDURE — 99214 OFFICE O/P EST MOD 30 MIN: CPT | Performed by: NURSE PRACTITIONER

## 2022-11-29 RX ORDER — AMLODIPINE BESYLATE 5 MG/1
5 TABLET ORAL DAILY
Qty: 30 TABLET | Refills: 2 | Status: SHIPPED | OUTPATIENT
Start: 2022-11-29 | End: 2023-01-30 | Stop reason: SDUPTHER

## 2022-11-29 RX ORDER — HYDROCHLOROTHIAZIDE 12.5 MG/1
12.5 TABLET ORAL DAILY
Qty: 30 TABLET | Refills: 2 | Status: SHIPPED | OUTPATIENT
Start: 2022-11-29 | End: 2023-01-30 | Stop reason: SDUPTHER

## 2022-11-29 NOTE — PROGRESS NOTES
Chief Complaint  Hypertension (Wants to change BP med dry hacky cough)    Subjective          Estelle Gutierrez presents to [facility]  History of Present Illness  She is here for her refill of her medication.   She is doing well with her blood pressure medicine but is having dry cough.  No chest pain shortness of breath.  She has been watching her diet with her cholesterol but she is not currently taking anything for cholesterol.    She would like to be referred to urogynecology for bladder drop.  She said she feels the bladder as she is peeing.    Allergies  Shellfish-derived products and Other    Social History     Tobacco Use   • Smoking status: Former     Packs/day: 1.00     Years: 36.00     Pack years: 36.00     Types: Cigarettes     Quit date:      Years since quittin.9   • Smokeless tobacco: Never   Vaping Use   • Vaping Use: Never used   Substance Use Topics   • Alcohol use: Not Currently   • Drug use: Never       Family History   Problem Relation Age of Onset   • Stroke Mother         90 yr old   • Heart disease Mother    • Heart disease Father    • Other Father         heart attack death   • Heart disease Brother         Health Maintenance Due   Topic Date Due   • ZOSTER VACCINE (1 of 2) Never done        Immunization History   Administered Date(s) Administered   • COVID-19 (PFIZER) BIVALENT BOOSTER 12+YRS 2022   • COVID-19 (PFIZER) PURPLE CAP 2021, 2021, 2021   • Flublok 18+yrs 10/18/2020   • Fluzone High-Dose 65+yrs 10/10/2021, 2022       Review of Systems   Constitutional: Negative for fatigue.   Respiratory: Positive for cough. Negative for shortness of breath.    Cardiovascular: Negative for chest pain.   Gastrointestinal: Negative for diarrhea, nausea and vomiting.        Objective       Vitals:    22 1548 22 1550   BP: 154/60 124/80   Pulse: 85    Resp: 16    Temp: 97.6 °F (36.4 °C)    SpO2: 98%    Weight: 80.1 kg (176  "lb 9.6 oz)    Height: 157.5 cm (62\")        Body mass index is 32.3 kg/m².         Physical Exam  Vitals reviewed.   Constitutional:       Appearance: Normal appearance. She is well-developed.   Cardiovascular:      Rate and Rhythm: Normal rate and regular rhythm.      Heart sounds: Murmur heard.   Pulmonary:      Effort: Pulmonary effort is normal.      Breath sounds: Normal breath sounds.   Neurological:      Mental Status: She is alert and oriented to person, place, and time.      Cranial Nerves: No cranial nerve deficit.      Motor: No weakness.   Psychiatric:         Mood and Affect: Mood and affect normal.             Result Review :     The following data was reviewed by: CECILIA Goode on 11/29/2022:    Common labs    Common Labs 2/3/22 2/3/22 2/3/22 5/26/22 5/26/22 5/26/22    0831 0831 0831 1025 1025 1025   Glucose  98   91    BUN  13   9    Creatinine  0.73   0.68    eGFR Non African Am  78       Sodium  140   142    Potassium  4.6   4.7    Chloride  104   104    Calcium  9.6   9.2    Albumin  4.30   3.80    Total Bilirubin  0.3   0.3    Alkaline Phosphatase  94   79    AST (SGOT)  18   19    ALT (SGPT)  16   14    WBC 5.77   5.36     Hemoglobin 13.9   13.3     Hematocrit 42.0   40.9     Platelets 258   267     Total Cholesterol   209 (A)   192   Triglycerides   163 (A)   226 (A)   HDL Cholesterol   54   45   LDL Cholesterol    126 (A)   108 (A)   (A) Abnormal value                               Assessment and Plan      Diagnoses and all orders for this visit:    1. Primary hypertension (Primary)  -     amLODIPine (Norvasc) 5 MG tablet; Take 1 tablet by mouth Daily.  Dispense: 30 tablet; Refill: 2  -     hydroCHLOROthiazide (HYDRODIURIL) 12.5 MG tablet; Take 1 tablet by mouth Daily.  Dispense: 30 tablet; Refill: 2  -     Comprehensive Metabolic Panel; Future  -     CBC & Differential; Future  -     TSH; Future  -     Lipid Panel; Future    2. Mixed hyperlipidemia  -     Comprehensive Metabolic " Panel; Future  -     CBC & Differential; Future  -     TSH; Future  -     Lipid Panel; Future    3. Bladder irritation  -     Ambulatory Referral to Gynecologic Urology    4. Fallen bladder  -     Ambulatory Referral to Gynecologic Urology            Follow Up     Return in about 3 months (around 2/28/2023) for Medicare Wellness 2/3/23.  She will get her blood work done at the hospital this upcoming Saturday.  I will switch her to amlodipine and hydrochlorothiazide by itself.  She will take amlodipine in the evening and the hydrochlorothiazide in the morning.  Call with questions or concerns.  She will keep a log of her blood pressure for the next 2 weeks and let me know how trending.  We will follow-up in 3 months.  Patient was given instructions and counseling regarding her condition or for health maintenance advice. Please see specific information pulled into the AVS if appropriate.         Karuna Rivers, APRN  11/29/2022

## 2022-12-02 ENCOUNTER — LAB (OUTPATIENT)
Dept: LAB | Facility: HOSPITAL | Age: 77
End: 2022-12-02

## 2022-12-02 DIAGNOSIS — E78.2 MIXED HYPERLIPIDEMIA: ICD-10-CM

## 2022-12-02 DIAGNOSIS — I10 PRIMARY HYPERTENSION: ICD-10-CM

## 2022-12-02 LAB
BASOPHILS # BLD AUTO: 0.04 10*3/MM3 (ref 0–0.2)
BASOPHILS NFR BLD AUTO: 0.5 % (ref 0–1.5)
CHOLEST SERPL-MCNC: 212 MG/DL (ref 0–200)
DEPRECATED RDW RBC AUTO: 39.5 FL (ref 37–54)
EOSINOPHIL # BLD AUTO: 0.09 10*3/MM3 (ref 0–0.4)
EOSINOPHIL NFR BLD AUTO: 1.1 % (ref 0.3–6.2)
ERYTHROCYTE [DISTWIDTH] IN BLOOD BY AUTOMATED COUNT: 12.5 % (ref 12.3–15.4)
HCT VFR BLD AUTO: 39.9 % (ref 34–46.6)
HDLC SERPL-MCNC: 50 MG/DL (ref 40–60)
HGB BLD-MCNC: 13.6 G/DL (ref 12–15.9)
IMM GRANULOCYTES # BLD AUTO: 0.03 10*3/MM3 (ref 0–0.05)
IMM GRANULOCYTES NFR BLD AUTO: 0.4 % (ref 0–0.5)
LDLC SERPL CALC-MCNC: 118 MG/DL (ref 0–100)
LDLC/HDLC SERPL: 2.24 {RATIO}
LYMPHOCYTES # BLD AUTO: 2.88 10*3/MM3 (ref 0.7–3.1)
LYMPHOCYTES NFR BLD AUTO: 35.1 % (ref 19.6–45.3)
MCH RBC QN AUTO: 29.6 PG (ref 26.6–33)
MCHC RBC AUTO-ENTMCNC: 34.1 G/DL (ref 31.5–35.7)
MCV RBC AUTO: 86.7 FL (ref 79–97)
MONOCYTES # BLD AUTO: 0.61 10*3/MM3 (ref 0.1–0.9)
MONOCYTES NFR BLD AUTO: 7.4 % (ref 5–12)
NEUTROPHILS NFR BLD AUTO: 4.55 10*3/MM3 (ref 1.7–7)
NEUTROPHILS NFR BLD AUTO: 55.5 % (ref 42.7–76)
NRBC BLD AUTO-RTO: 0 /100 WBC (ref 0–0.2)
PLATELET # BLD AUTO: 290 10*3/MM3 (ref 140–450)
PMV BLD AUTO: 9.9 FL (ref 6–12)
RBC # BLD AUTO: 4.6 10*6/MM3 (ref 3.77–5.28)
TRIGL SERPL-MCNC: 250 MG/DL (ref 0–150)
TSH SERPL DL<=0.05 MIU/L-ACNC: 2.29 UIU/ML (ref 0.27–4.2)
VLDLC SERPL-MCNC: 44 MG/DL (ref 5–40)
WBC NRBC COR # BLD: 8.2 10*3/MM3 (ref 3.4–10.8)

## 2022-12-02 PROCEDURE — 80061 LIPID PANEL: CPT

## 2022-12-02 PROCEDURE — 80053 COMPREHEN METABOLIC PANEL: CPT

## 2022-12-02 PROCEDURE — 85025 COMPLETE CBC W/AUTO DIFF WBC: CPT

## 2022-12-02 PROCEDURE — 84443 ASSAY THYROID STIM HORMONE: CPT

## 2022-12-02 PROCEDURE — 36415 COLL VENOUS BLD VENIPUNCTURE: CPT

## 2022-12-03 LAB
ALBUMIN SERPL-MCNC: 4.7 G/DL (ref 3.5–5.2)
ALBUMIN/GLOB SERPL: 1.6 G/DL
ALP SERPL-CCNC: 92 U/L (ref 39–117)
ALT SERPL W P-5'-P-CCNC: 16 U/L (ref 1–33)
ANION GAP SERPL CALCULATED.3IONS-SCNC: 11.5 MMOL/L (ref 5–15)
AST SERPL-CCNC: 19 U/L (ref 1–32)
BILIRUB SERPL-MCNC: 0.4 MG/DL (ref 0–1.2)
BUN SERPL-MCNC: 10 MG/DL (ref 8–23)
BUN/CREAT SERPL: 13.3 (ref 7–25)
CALCIUM SPEC-SCNC: 9.3 MG/DL (ref 8.6–10.5)
CHLORIDE SERPL-SCNC: 97 MMOL/L (ref 98–107)
CO2 SERPL-SCNC: 27.5 MMOL/L (ref 22–29)
CREAT SERPL-MCNC: 0.75 MG/DL (ref 0.57–1)
EGFRCR SERPLBLD CKD-EPI 2021: 82.1 ML/MIN/1.73
GLOBULIN UR ELPH-MCNC: 2.9 GM/DL
GLUCOSE SERPL-MCNC: 88 MG/DL (ref 65–99)
POTASSIUM SERPL-SCNC: 3.5 MMOL/L (ref 3.5–5.2)
PROT SERPL-MCNC: 7.6 G/DL (ref 6–8.5)
SODIUM SERPL-SCNC: 136 MMOL/L (ref 136–145)

## 2022-12-26 DIAGNOSIS — I10 PRIMARY HYPERTENSION: ICD-10-CM

## 2022-12-27 RX ORDER — LISINOPRIL AND HYDROCHLOROTHIAZIDE 20; 12.5 MG/1; MG/1
TABLET ORAL
Qty: 30 TABLET | Refills: 0 | OUTPATIENT
Start: 2022-12-27

## 2023-01-26 DIAGNOSIS — I10 PRIMARY HYPERTENSION: ICD-10-CM

## 2023-01-26 RX ORDER — AMLODIPINE BESYLATE 5 MG/1
5 TABLET ORAL DAILY
Qty: 30 TABLET | Refills: 2 | OUTPATIENT
Start: 2023-01-26

## 2023-01-26 RX ORDER — HYDROCHLOROTHIAZIDE 12.5 MG/1
12.5 TABLET ORAL DAILY
Qty: 30 TABLET | Refills: 2 | OUTPATIENT
Start: 2023-01-26

## 2023-01-26 NOTE — TELEPHONE ENCOUNTER
PATIENT STATED THAT WHEN SHE IS UP ON HER FEET DURING THE DAY HER ANKLES SWELL SO SHE WANTED TO GET IT INCREASED BUT SHE WILL HAVE IT FILLED THE ORIGINAL WAY UNTIL SHE SEE YOU IN FEBRUARY

## 2023-01-26 NOTE — TELEPHONE ENCOUNTER
Relationship: Self    Best call back number: [phone]    Requested Prescriptions:   Requested Prescriptions     Pending Prescriptions Disp Refills   • hydroCHLOROthiazide (HYDRODIURIL) 12.5 MG tablet 30 tablet 2     Sig: Take 1 tablet by mouth Daily.        Pharmacy where request should be sent: [facility] [place] - [address] - [phone]  - [phone]     Additional details provided by patient: EARLIER REQUEST WAS IN ERROR AND PATIENT WAS REQUESTING AN INCREASE IN THIS MEDICATION TO REDUCE SWELLING     Does the patient have less than a 3 day supply:  [x] Yes  [] No    Would you like a call back once the refill request has been completed: [x] Yes [] No    If the office needs to give you a call back, can they leave a voicemail: [x] Yes [] No    Michele Ortega Rep   01/26/23 12:48 EST            no

## 2023-01-26 NOTE — TELEPHONE ENCOUNTER
Caller: Estelle Gutierrez    Relationship: Self    Best call back number: [phone]    Requested Prescriptions:   Requested Prescriptions     Pending Prescriptions Disp Refills   • amLODIPine (Norvasc) 5 MG tablet 30 tablet 2     Sig: Take 1 tablet by mouth Daily.        [facility] where request should be sent: [facility] [address] [address] - [phone]  - [phone] FX     Additional details provided by patient: THE PATIENT STATED HER ANKLES ARE SWELLING IF SHE IS ON HER FEET AND WOULD LIKE TO KNOW IF PCP WILL INCREASE DOSAGE    Does the patient have less than a 3 day supply:  [x] Yes  [] No    Would you like a call back once the refill request has been completed: [x] Yes [] No    If the office needs to give you a call back, can they leave a voicemail: [x] Yes [] No    Michele Prather Rep   01/26/23 10:19 EST

## 2023-01-26 NOTE — TELEPHONE ENCOUNTER
Per new refill request patient does not want this Rx refilled or does changed. Please see new refill request

## 2023-01-30 DIAGNOSIS — I10 PRIMARY HYPERTENSION: ICD-10-CM

## 2023-01-30 RX ORDER — HYDROCHLOROTHIAZIDE 12.5 MG/1
TABLET ORAL
Qty: 30 TABLET | Refills: 2 | OUTPATIENT
Start: 2023-01-30

## 2023-01-30 RX ORDER — AMLODIPINE BESYLATE 5 MG/1
TABLET ORAL
Qty: 30 TABLET | Refills: 2 | OUTPATIENT
Start: 2023-01-30

## 2023-01-30 RX ORDER — LISINOPRIL AND HYDROCHLOROTHIAZIDE 20; 12.5 MG/1; MG/1
TABLET ORAL
Qty: 30 TABLET | Refills: 0 | OUTPATIENT
Start: 2023-01-30

## 2023-01-30 RX ORDER — AMLODIPINE BESYLATE 5 MG/1
5 TABLET ORAL DAILY
Qty: 90 TABLET | Refills: 0 | Status: SHIPPED | OUTPATIENT
Start: 2023-01-30 | End: 2023-02-28 | Stop reason: SDUPTHER

## 2023-01-30 RX ORDER — HYDROCHLOROTHIAZIDE 12.5 MG/1
12.5 TABLET ORAL DAILY
Qty: 90 TABLET | Refills: 0 | Status: SHIPPED | OUTPATIENT
Start: 2023-01-30 | End: 2023-02-28 | Stop reason: SDUPTHER

## 2023-01-30 NOTE — TELEPHONE ENCOUNTER
Alanis with [facility] requesting refill for Alpdipine and hydrocholrothiazide  for 90 days due to Insurance. Patient has appt 2/28/23  Refill sent

## 2023-02-28 ENCOUNTER — OFFICE VISIT (OUTPATIENT)
Dept: FAMILY MEDICINE CLINIC | Facility: CLINIC | Age: 78
End: 2023-02-28
Payer: COMMERCIAL

## 2023-02-28 VITALS
RESPIRATION RATE: 18 BRPM | SYSTOLIC BLOOD PRESSURE: 120 MMHG | HEART RATE: 75 BPM | HEIGHT: 62 IN | BODY MASS INDEX: 32.28 KG/M2 | DIASTOLIC BLOOD PRESSURE: 82 MMHG | OXYGEN SATURATION: 97 % | TEMPERATURE: 97.4 F | WEIGHT: 175.4 LBS

## 2023-02-28 DIAGNOSIS — I10 PRIMARY HYPERTENSION: ICD-10-CM

## 2023-02-28 DIAGNOSIS — Z00.00 MEDICARE ANNUAL WELLNESS VISIT, SUBSEQUENT: Primary | ICD-10-CM

## 2023-02-28 DIAGNOSIS — E78.2 MIXED HYPERLIPIDEMIA: ICD-10-CM

## 2023-02-28 LAB
ALBUMIN SERPL-MCNC: 4 G/DL (ref 3.5–5.2)
ALBUMIN/GLOB SERPL: 1.4 G/DL
ALP SERPL-CCNC: 88 U/L (ref 39–117)
ALT SERPL W P-5'-P-CCNC: 14 U/L (ref 1–33)
ANION GAP SERPL CALCULATED.3IONS-SCNC: 7.6 MMOL/L (ref 5–15)
AST SERPL-CCNC: 21 U/L (ref 1–32)
BASOPHILS # BLD AUTO: 0.04 10*3/MM3 (ref 0–0.2)
BASOPHILS NFR BLD AUTO: 0.6 % (ref 0–1.5)
BILIRUB SERPL-MCNC: 0.3 MG/DL (ref 0–1.2)
BUN SERPL-MCNC: 11 MG/DL (ref 8–23)
BUN/CREAT SERPL: 14.7 (ref 7–25)
CALCIUM SPEC-SCNC: 9.2 MG/DL (ref 8.6–10.5)
CHLORIDE SERPL-SCNC: 104 MMOL/L (ref 98–107)
CHOLEST SERPL-MCNC: 172 MG/DL (ref 0–200)
CO2 SERPL-SCNC: 27.4 MMOL/L (ref 22–29)
CREAT SERPL-MCNC: 0.75 MG/DL (ref 0.57–1)
DEPRECATED RDW RBC AUTO: 39.6 FL (ref 37–54)
EGFRCR SERPLBLD CKD-EPI 2021: 82.1 ML/MIN/1.73
EOSINOPHIL # BLD AUTO: 0.07 10*3/MM3 (ref 0–0.4)
EOSINOPHIL NFR BLD AUTO: 1.1 % (ref 0.3–6.2)
ERYTHROCYTE [DISTWIDTH] IN BLOOD BY AUTOMATED COUNT: 12.4 % (ref 12.3–15.4)
GLOBULIN UR ELPH-MCNC: 2.8 GM/DL
GLUCOSE SERPL-MCNC: 91 MG/DL (ref 65–99)
HCT VFR BLD AUTO: 39.2 % (ref 34–46.6)
HDLC SERPL-MCNC: 43 MG/DL (ref 40–60)
HGB BLD-MCNC: 13.3 G/DL (ref 12–15.9)
IMM GRANULOCYTES # BLD AUTO: 0.01 10*3/MM3 (ref 0–0.05)
IMM GRANULOCYTES NFR BLD AUTO: 0.2 % (ref 0–0.5)
LDLC SERPL CALC-MCNC: 98 MG/DL (ref 0–100)
LDLC/HDLC SERPL: 2.17 {RATIO}
LYMPHOCYTES # BLD AUTO: 2.04 10*3/MM3 (ref 0.7–3.1)
LYMPHOCYTES NFR BLD AUTO: 31.3 % (ref 19.6–45.3)
MCH RBC QN AUTO: 29.6 PG (ref 26.6–33)
MCHC RBC AUTO-ENTMCNC: 33.9 G/DL (ref 31.5–35.7)
MCV RBC AUTO: 87.1 FL (ref 79–97)
MONOCYTES # BLD AUTO: 0.56 10*3/MM3 (ref 0.1–0.9)
MONOCYTES NFR BLD AUTO: 8.6 % (ref 5–12)
NEUTROPHILS NFR BLD AUTO: 3.79 10*3/MM3 (ref 1.7–7)
NEUTROPHILS NFR BLD AUTO: 58.2 % (ref 42.7–76)
NRBC BLD AUTO-RTO: 0 /100 WBC (ref 0–0.2)
PLATELET # BLD AUTO: 280 10*3/MM3 (ref 140–450)
PMV BLD AUTO: 10.1 FL (ref 6–12)
POTASSIUM SERPL-SCNC: 4.4 MMOL/L (ref 3.5–5.2)
PROT SERPL-MCNC: 6.8 G/DL (ref 6–8.5)
RBC # BLD AUTO: 4.5 10*6/MM3 (ref 3.77–5.28)
SODIUM SERPL-SCNC: 139 MMOL/L (ref 136–145)
TRIGL SERPL-MCNC: 179 MG/DL (ref 0–150)
TSH SERPL DL<=0.05 MIU/L-ACNC: 2.64 UIU/ML (ref 0.27–4.2)
VLDLC SERPL-MCNC: 31 MG/DL (ref 5–40)
WBC NRBC COR # BLD: 6.51 10*3/MM3 (ref 3.4–10.8)

## 2023-02-28 PROCEDURE — 99214 OFFICE O/P EST MOD 30 MIN: CPT | Performed by: NURSE PRACTITIONER

## 2023-02-28 PROCEDURE — 84443 ASSAY THYROID STIM HORMONE: CPT | Performed by: NURSE PRACTITIONER

## 2023-02-28 PROCEDURE — 80061 LIPID PANEL: CPT | Performed by: NURSE PRACTITIONER

## 2023-02-28 PROCEDURE — 1159F MED LIST DOCD IN RCRD: CPT | Performed by: NURSE PRACTITIONER

## 2023-02-28 PROCEDURE — 36415 COLL VENOUS BLD VENIPUNCTURE: CPT | Performed by: NURSE PRACTITIONER

## 2023-02-28 PROCEDURE — 1170F FXNL STATUS ASSESSED: CPT | Performed by: NURSE PRACTITIONER

## 2023-02-28 PROCEDURE — G0439 PPPS, SUBSEQ VISIT: HCPCS | Performed by: NURSE PRACTITIONER

## 2023-02-28 PROCEDURE — 85025 COMPLETE CBC W/AUTO DIFF WBC: CPT | Performed by: NURSE PRACTITIONER

## 2023-02-28 PROCEDURE — 80053 COMPREHEN METABOLIC PANEL: CPT | Performed by: NURSE PRACTITIONER

## 2023-02-28 RX ORDER — HYDROCHLOROTHIAZIDE 12.5 MG/1
TABLET ORAL
Qty: 180 TABLET | Refills: 1 | Status: SHIPPED | OUTPATIENT
Start: 2023-02-28

## 2023-02-28 RX ORDER — SENNOSIDES 8.6 MG
650 CAPSULE ORAL EVERY 8 HOURS PRN
COMMUNITY

## 2023-02-28 RX ORDER — AMLODIPINE BESYLATE 5 MG/1
5 TABLET ORAL DAILY
Qty: 90 TABLET | Refills: 1 | Status: SHIPPED | OUTPATIENT
Start: 2023-02-28

## 2023-02-28 NOTE — PROGRESS NOTES
The ABCs of the Annual Wellness Visit  Subsequent Medicare Wellness Visit    Subjective    Estelle Gutierrez is a 77 y.o. female who presents for a Subsequent Medicare Wellness Visit.    The following portions of the patient's history were reviewed and   updated as appropriate: allergies, current medications, past family history, past medical history, past social history, past surgical history and problem list.    Compared to one year ago, the patient feels her physical   health is the same.    Compared to one year ago, the patient feels her mental   health is the same.    Recent Hospitalizations:  She was not admitted to the hospital during the last year.       Current Medical Providers:  Patient Care Team:  Karuna Rivers APRN as PCP - General (Nurse Practitioner)    Outpatient Medications Prior to Visit   Medication Sig Dispense Refill   • acetaminophen (TYLENOL) 650 MG 8 hr tablet Take 1 tablet by mouth Every 8 (Eight) Hours As Needed.     • amLODIPine (Norvasc) 5 MG tablet Take 1 tablet by mouth Daily. 90 tablet 0   • hydroCHLOROthiazide (HYDRODIURIL) 12.5 MG tablet Take 1 tablet by mouth Daily. 90 tablet 0     No facility-administered medications prior to visit.       No opioid medication identified on active medication list. I have reviewed chart for other potential  high risk medication/s and harmful drug interactions in the elderly.          Aspirin is not on active medication list.  Aspirin use is not indicated based on review of current medical condition/s. Risk of harm outweighs potential benefits.  .    Patient Active Problem List   Diagnosis   • Arthritis   • Basal cell carcinoma   • Primary hypertension   • Murmur   • Screening for malignant neoplasm of colon   • History of colonic polyps     Advance Care Planning  Advance Directive is not on file.  ACP discussion was declined by the patient. Patient does not have an advance directive, information provided.     Objective    Vitals:    02/28/23  "0825 23 0832 23 0843   BP: 152/80 158/60 120/82   Pulse: 75     Resp: 18     Temp: 97.4 °F (36.3 °C)     SpO2: 97%     Weight: 79.6 kg (175 lb 6.4 oz)     Height: 157.5 cm (62\")       Estimated body mass index is 32.08 kg/m² as calculated from the following:    Height as of this encounter: 157.5 cm (62\").    Weight as of this encounter: 79.6 kg (175 lb 6.4 oz).    BMI is >= 30 and <35. (Class 1 Obesity). The following options were offered after discussion;: exercise counseling/recommendations and nutrition counseling/recommendations      Does the patient have evidence of cognitive impairment? No    Lab Results   Component Value Date    TRIG 250 (H) 2022    HDL 50 2022     (H) 2022    VLDL 44 (H) 2022        HEALTH RISK ASSESSMENT    Smoking Status:  Social History     Tobacco Use   Smoking Status Former   • Packs/day: 1.00   • Years: 36.00   • Pack years: 36.00   • Types: Cigarettes   • Quit date:    • Years since quittin.1   Smokeless Tobacco Never     Alcohol Consumption:  Social History     Substance and Sexual Activity   Alcohol Use Not Currently     Fall Risk Screen:    BERYL Fall Risk Assessment was completed, and patient is at LOW risk for falls.Assessment completed on:2023    Depression Screening:  PHQ-2/PHQ-9 Depression Screening 2023   Little Interest or Pleasure in Doing Things 0-->not at all   Feeling Down, Depressed or Hopeless 0-->not at all   PHQ-9: Brief Depression Severity Measure Score 0       Health Habits and Functional and Cognitive Screening:  Functional & Cognitive Status 2023   Do you have difficulty preparing food and eating? No   Do you have difficulty bathing yourself, getting dressed or grooming yourself? No   Do you have difficulty using the toilet? No   Do you have difficulty moving around from place to place? No   Do you have trouble with steps or getting out of a bed or a chair? No   Current Diet Well Balanced Diet "   Dental Exam Not up to date   Eye Exam Not up to date   Exercise (times per week) 5 times per week   Current Exercises Include Walking;Treadmill        Exercise Comment walking 1 mile   Do you need help using the phone?  No   Are you deaf or do you have serious difficulty hearing?  Yes   Do you need help with transportation? No   Do you need help shopping? No   Do you need help preparing meals?  No   Do you need help with housework?  No   Do you need help with laundry? No   Do you need help taking your medications? -   Do you need help managing money? No   Do you ever drive or ride in a car without wearing a seat belt? No   Have you felt unusual stress, anger or loneliness in the last month? No   Who do you live with? Spouse   If you need help, do you have trouble finding someone available to you? No   Have you been bothered in the last four weeks by sexual problems? No   Do you have difficulty concentrating, remembering or making decisions? Yes   She does not have issues remembering day-to-day and bills.  She does has sometimes of where things potentially may be over the course of a few weeks.    Age-appropriate Screening Schedule:  Refer to the list below for future screening recommendations based on patient's age, sex and/or medical conditions. Orders for these recommended tests are listed in the plan section. The patient has been provided with a written plan.    Health Maintenance   Topic Date Due   • ZOSTER VACCINE (1 of 2) 02/28/2023 (Originally 7/27/1995)   • TDAP/TD VACCINES (1 - Tdap) 05/26/2023 (Originally 7/27/1964)   • Pneumococcal Vaccine 65+ (1 - PCV) 02/28/2024 (Originally 7/27/2010)   • LIPID PANEL  12/02/2023   • ANNUAL WELLNESS VISIT  02/28/2024   • DXA SCAN  05/26/2024   • COLORECTAL CANCER SCREENING  07/28/2032   • HEPATITIS C SCREENING  Completed   • COVID-19 Vaccine  Completed   • INFLUENZA VACCINE  Completed                CMS Preventative Services Quick Reference  Risk Factors Identified  "During Encounter  Fall Risk-High or Moderate: Discussed Fall Prevention in the home  Immunizations Discussed/Encouraged: Td, Influenza, Prevnar 20 (Pneumococcal 20-valent conjugate), Shingrix and COVID19  Dental Screening Recommended  Vision Screening Recommended  The above risks/problems have been discussed with the patient.  Pertinent information has been shared with the patient in the After Visit Summary.  An After Visit Summary and PPPS were made available to the patient.    Follow Up:   Next Medicare Wellness visit to be scheduled in 1 year.       Additional E&M Note during same encounter follows:  Patient has multiple medical problems which are significant and separately identifiable that require additional work above and beyond the Medicare Wellness Visit.      Chief Complaint  Medicare Wellness-subsequent, Hypertension, and Leg Swelling (States swelling feet and ankles/)    Subjective        HPI  Estelle Gutierrez is also being seen today for   HTN: He feels her blood pressure is doing good at home.  It usually runs about 120s over the 60s.  No shortness of breath or coughing noted.  She does have increased leg swelling mainly happening on Sunday into Monday after going to [employer] eating dinner etc.  She is wanting to increase water pill just for a few days during [employer] time.  She does not drink as much fluids on Sunday and Monday as she is moving around.  LIPID: She is not taking anything for cholesterol and does not really want to take anything for cholesterol.    Review of Systems   Constitutional: Negative for fatigue.   Respiratory: Negative for cough and shortness of breath.    Cardiovascular: Positive for leg swelling. Negative for chest pain.   Gastrointestinal: Negative for nausea and vomiting.   Psychiatric/Behavioral: Negative for hallucinations and suicidal ideas.       Objective   Vital Signs:  /82   Pulse 75   Temp 97.4 °F (36.3 °C)   Resp 18   Ht 157.5 cm (62\")   Wt 79.6 kg (175 lb 6.4 " oz)   SpO2 97%   BMI 32.08 kg/m²     Physical Exam  Vitals reviewed.   Constitutional:       Appearance: Normal appearance. She is well-developed.   Cardiovascular:      Rate and Rhythm: Normal rate and regular rhythm.      Heart sounds: Murmur heard.   Pulmonary:      Effort: Pulmonary effort is normal.      Breath sounds: Normal breath sounds.   Neurological:      Mental Status: She is alert and oriented to person, place, and time.      Cranial Nerves: No cranial nerve deficit.      Motor: No weakness.   Psychiatric:         Mood and Affect: Mood and affect normal.            Common labs    Common Labs 5/26/22 5/26/22 5/26/22 12/2/22 12/2/22 12/2/22    1025 1025 1025 1352 1352 1352   Glucose  91    88   BUN  9    10   Creatinine  0.68    0.75   Sodium  142    136   Potassium  4.7    3.5   Chloride  104    97 (A)   Calcium  9.2    9.3   Albumin  3.80    4.70   Total Bilirubin  0.3    0.4   Alkaline Phosphatase  79    92   AST (SGOT)  19    19   ALT (SGPT)  14    16   WBC 5.36   8.20     Hemoglobin 13.3   13.6     Hematocrit 40.9   39.9     Platelets 267   290     Total Cholesterol   192  212 (A)    Triglycerides   226 (A)  250 (A)    HDL Cholesterol   45  50    LDL Cholesterol    108 (A)  118 (A)    (A) Abnormal value                           Assessment and Plan   Diagnoses and all orders for this visit:    1. Medicare annual wellness visit, subsequent (Primary)    2. Primary hypertension  -     Comprehensive Metabolic Panel  -     CBC & Differential  -     TSH  -     Lipid Panel  -     amLODIPine (Norvasc) 5 MG tablet; Take 1 tablet by mouth Daily.  Dispense: 90 tablet; Refill: 1  -     hydroCHLOROthiazide (HYDRODIURIL) 12.5 MG tablet; Take 1 tab in the am and 1 tab at 1pm  Dispense: 180 tablet; Refill: 1    3. Mixed hyperlipidemia  -     Comprehensive Metabolic Panel  -     CBC & Differential  -     TSH  -     Lipid Panel             Follow Up   Return in about 6 months (around 8/28/2023).   Follow-up in 6  months for labs and appt. Call with any concerns or questions that you may have regarding your medications or history.    We will adjust the BP med (water pill) due to the swelling of the legs mostly on Sun/Mon.  Check BP randomly to make sure we are not dropping low--100's/50's.  Patient was given instructions and counseling regarding her condition or for health maintenance advice. Please see specific information pulled into the AVS if appropriate.

## 2023-08-28 ENCOUNTER — OFFICE VISIT (OUTPATIENT)
Dept: FAMILY MEDICINE CLINIC | Facility: CLINIC | Age: 78
End: 2023-08-28
Payer: COMMERCIAL

## 2023-08-28 VITALS
WEIGHT: 173.4 LBS | DIASTOLIC BLOOD PRESSURE: 84 MMHG | BODY MASS INDEX: 31.91 KG/M2 | HEIGHT: 62 IN | SYSTOLIC BLOOD PRESSURE: 142 MMHG | OXYGEN SATURATION: 96 % | TEMPERATURE: 97.6 F | HEART RATE: 79 BPM | RESPIRATION RATE: 18 BRPM

## 2023-08-28 DIAGNOSIS — I10 PRIMARY HYPERTENSION: Primary | ICD-10-CM

## 2023-08-28 DIAGNOSIS — R01.1 MURMUR: ICD-10-CM

## 2023-08-28 DIAGNOSIS — E78.2 MIXED HYPERLIPIDEMIA: ICD-10-CM

## 2023-08-28 LAB
ALBUMIN SERPL-MCNC: 4.1 G/DL (ref 3.5–5.2)
ALBUMIN/GLOB SERPL: 1.2 G/DL
ALP SERPL-CCNC: 92 U/L (ref 39–117)
ALT SERPL W P-5'-P-CCNC: 16 U/L (ref 1–33)
ANION GAP SERPL CALCULATED.3IONS-SCNC: 11.8 MMOL/L (ref 5–15)
AST SERPL-CCNC: 18 U/L (ref 1–32)
BASOPHILS # BLD AUTO: 0.03 10*3/MM3 (ref 0–0.2)
BASOPHILS NFR BLD AUTO: 0.5 % (ref 0–1.5)
BILIRUB SERPL-MCNC: 0.4 MG/DL (ref 0–1.2)
BUN SERPL-MCNC: 14 MG/DL (ref 8–23)
BUN/CREAT SERPL: 19.2 (ref 7–25)
CALCIUM SPEC-SCNC: 9.3 MG/DL (ref 8.6–10.5)
CHLORIDE SERPL-SCNC: 102 MMOL/L (ref 98–107)
CHOLEST SERPL-MCNC: 172 MG/DL (ref 0–200)
CO2 SERPL-SCNC: 24.2 MMOL/L (ref 22–29)
CREAT SERPL-MCNC: 0.73 MG/DL (ref 0.57–1)
DEPRECATED RDW RBC AUTO: 42.1 FL (ref 37–54)
EGFRCR SERPLBLD CKD-EPI 2021: 84.3 ML/MIN/1.73
EOSINOPHIL # BLD AUTO: 0.08 10*3/MM3 (ref 0–0.4)
EOSINOPHIL NFR BLD AUTO: 1.2 % (ref 0.3–6.2)
ERYTHROCYTE [DISTWIDTH] IN BLOOD BY AUTOMATED COUNT: 13.5 % (ref 12.3–15.4)
GLOBULIN UR ELPH-MCNC: 3.3 GM/DL
GLUCOSE SERPL-MCNC: 108 MG/DL (ref 65–99)
HCT VFR BLD AUTO: 42.1 % (ref 34–46.6)
HDLC SERPL-MCNC: 45 MG/DL (ref 40–60)
HGB BLD-MCNC: 14.2 G/DL (ref 12–15.9)
IMM GRANULOCYTES # BLD AUTO: 0.02 10*3/MM3 (ref 0–0.05)
IMM GRANULOCYTES NFR BLD AUTO: 0.3 % (ref 0–0.5)
LDLC SERPL CALC-MCNC: 102 MG/DL (ref 0–100)
LDLC/HDLC SERPL: 2.2 {RATIO}
LYMPHOCYTES # BLD AUTO: 2.2 10*3/MM3 (ref 0.7–3.1)
LYMPHOCYTES NFR BLD AUTO: 33.2 % (ref 19.6–45.3)
MCH RBC QN AUTO: 29 PG (ref 26.6–33)
MCHC RBC AUTO-ENTMCNC: 33.7 G/DL (ref 31.5–35.7)
MCV RBC AUTO: 85.9 FL (ref 79–97)
MONOCYTES # BLD AUTO: 0.59 10*3/MM3 (ref 0.1–0.9)
MONOCYTES NFR BLD AUTO: 8.9 % (ref 5–12)
NEUTROPHILS NFR BLD AUTO: 3.7 10*3/MM3 (ref 1.7–7)
NEUTROPHILS NFR BLD AUTO: 55.9 % (ref 42.7–76)
NRBC BLD AUTO-RTO: 0 /100 WBC (ref 0–0.2)
PLATELET # BLD AUTO: 312 10*3/MM3 (ref 140–450)
PMV BLD AUTO: 10.3 FL (ref 6–12)
POTASSIUM SERPL-SCNC: 4.4 MMOL/L (ref 3.5–5.2)
PROT SERPL-MCNC: 7.4 G/DL (ref 6–8.5)
RBC # BLD AUTO: 4.9 10*6/MM3 (ref 3.77–5.28)
SODIUM SERPL-SCNC: 138 MMOL/L (ref 136–145)
TRIGL SERPL-MCNC: 139 MG/DL (ref 0–150)
TSH SERPL DL<=0.05 MIU/L-ACNC: 2.7 UIU/ML (ref 0.27–4.2)
VLDLC SERPL-MCNC: 25 MG/DL (ref 5–40)
WBC NRBC COR # BLD: 6.62 10*3/MM3 (ref 3.4–10.8)

## 2023-08-28 PROCEDURE — 1159F MED LIST DOCD IN RCRD: CPT | Performed by: NURSE PRACTITIONER

## 2023-08-28 PROCEDURE — 99213 OFFICE O/P EST LOW 20 MIN: CPT | Performed by: NURSE PRACTITIONER

## 2023-08-28 PROCEDURE — 84443 ASSAY THYROID STIM HORMONE: CPT | Performed by: NURSE PRACTITIONER

## 2023-08-28 PROCEDURE — 85025 COMPLETE CBC W/AUTO DIFF WBC: CPT | Performed by: NURSE PRACTITIONER

## 2023-08-28 PROCEDURE — 3079F DIAST BP 80-89 MM HG: CPT | Performed by: NURSE PRACTITIONER

## 2023-08-28 PROCEDURE — 80053 COMPREHEN METABOLIC PANEL: CPT | Performed by: NURSE PRACTITIONER

## 2023-08-28 PROCEDURE — 3077F SYST BP >= 140 MM HG: CPT | Performed by: NURSE PRACTITIONER

## 2023-08-28 PROCEDURE — 80061 LIPID PANEL: CPT | Performed by: NURSE PRACTITIONER

## 2023-08-28 PROCEDURE — 1160F RVW MEDS BY RX/DR IN RCRD: CPT | Performed by: NURSE PRACTITIONER

## 2023-08-28 RX ORDER — AMLODIPINE BESYLATE 5 MG/1
5 TABLET ORAL DAILY
Qty: 90 TABLET | Refills: 1 | Status: SHIPPED | OUTPATIENT
Start: 2023-08-28

## 2023-08-28 RX ORDER — HYDROCHLOROTHIAZIDE 12.5 MG/1
TABLET ORAL
Qty: 180 TABLET | Refills: 1 | Status: CANCELLED | OUTPATIENT
Start: 2023-08-28

## 2023-08-28 RX ORDER — LIFITEGRAST 50 MG/ML
1 SOLUTION/ DROPS OPHTHALMIC 2 TIMES DAILY
COMMUNITY
Start: 2023-07-13

## 2023-08-28 NOTE — PROGRESS NOTES
"Chief Complaint  Hypertension    Subjective          Estelle Gutierrez presents to [facility]  History of Present Illness  HTN:  She has been eating more salt with fresh veggie.  Norvasc at night and HCTZ in the AM sometimes.    LIPID:  No meds but has been doing treadmill but only when it is cool as it is in the garage.      Depression: Not at risk    PHQ-2 Score: 0    and 2023    Allergies  Shellfish-derived products and Other    Social History     Tobacco Use    Smoking status: Former     Packs/day: 1.00     Years: 36.00     Pack years: 36.00     Types: Cigarettes     Quit date:      Years since quittin.6    Smokeless tobacco: Never   Vaping Use    Vaping Use: Never used   Substance Use Topics    Alcohol use: Not Currently    Drug use: Never       Family History   Problem Relation Age of Onset    Stroke Mother         90 yr old    Heart disease Mother     Heart disease Father     Other Father         heart attack death    Heart disease Brother         There are no preventive care reminders to display for this patient.       Immunization History   Administered Date(s) Administered    COVID-19 (PFIZER) BIVALENT 12+YRS 2022    COVID-19 (PFIZER) Purple Cap Monovalent 2021, 2021, 2021    Flublok 18+yrs 10/18/2020    Fluzone High-Dose 65+yrs 10/10/2021, 2022       Review of Systems   Constitutional:  Negative for fatigue.   Respiratory:  Negative for cough and shortness of breath.    Cardiovascular:  Negative for chest pain.   Gastrointestinal:  Negative for diarrhea, nausea and vomiting.      Objective       Vitals:    23 0756 23 0814   BP: 160/100 142/84   Pulse: 79    Resp: 18    Temp: 97.6 øF (36.4 øC)    SpO2: 96%    Weight: 78.7 kg (173 lb 6.4 oz)    Height: 157.5 cm (62\")        Body mass index is 31.72 kg/mý.         Physical Exam  Vitals reviewed.   Constitutional:       Appearance: Normal appearance. She is well-developed. "   Cardiovascular:      Rate and Rhythm: Normal rate and regular rhythm.      Heart sounds: Normal heart sounds. Murmur heard.   Pulmonary:      Effort: Pulmonary effort is normal.      Breath sounds: Normal breath sounds.   Neurological:      Mental Status: She is alert and oriented to person, place, and time.      Cranial Nerves: No cranial nerve deficit.      Motor: No weakness.   Psychiatric:         Mood and Affect: Mood and affect normal.           Result Review :     The following data was reviewed by: CECILIA Goode on 08/28/2023:    Common Labs   Common labs          12/2/2022    13:52 2/28/2023    08:55   Common Labs   Glucose 88  91    BUN 10  11    Creatinine 0.75  0.75    Sodium 136  139    Potassium 3.5  4.4    Chloride 97  104    Calcium 9.3  9.2    Albumin 4.70  4.0    Total Bilirubin 0.4  0.3    Alkaline Phosphatase 92  88    AST (SGOT) 19  21    ALT (SGPT) 16  14    WBC 8.20  6.51    Hemoglobin 13.6  13.3    Hematocrit 39.9  39.2    Platelets 290  280    Total Cholesterol 212  172    Triglycerides 250  179    HDL Cholesterol 50  43    LDL Cholesterol  118  98                     Assessment and Plan      Diagnoses and all orders for this visit:    1. Primary hypertension (Primary)  -     amLODIPine (Norvasc) 5 MG tablet; Take 1 tablet by mouth Daily.  Dispense: 90 tablet; Refill: 1  -     Comprehensive Metabolic Panel  -     CBC & Differential  -     TSH  -     Lipid Panel    2. Murmur    3. Mixed hyperlipidemia            Follow Up     Return in about 6 months (around 2/28/2024) for Medicare Wellness 02/28/23.  Follow-up in 6 months for labs and appt. Call with any concerns or questions that you may have regarding your medications or history.  She will start taking the HCTZ in the am while your eating the fresh veggies and added salt.    I have reviewed all medications and at this time no medications changes need to be adjusted for all chronic conditions.  No change noted of the murmur  and we will cont to monitor and she declines a echo.    Patient was given instructions and counseling regarding her condition or for health maintenance advice. Please see specific information pulled into the AVS if appropriate.     Parts of this note are electronic transcriptions/translations of spoken language to printed text using the Dragon Dictation system.          Karuna Rivers, CECILIA  08/28/2023

## 2023-10-24 DIAGNOSIS — I10 PRIMARY HYPERTENSION: ICD-10-CM

## 2023-10-24 RX ORDER — HYDROCHLOROTHIAZIDE 12.5 MG/1
TABLET ORAL
Qty: 180 TABLET | Refills: 1 | Status: SHIPPED | OUTPATIENT
Start: 2023-10-24

## 2023-11-02 PROCEDURE — 87186 SC STD MICRODIL/AGAR DIL: CPT | Performed by: PHYSICIAN ASSISTANT

## 2023-11-02 PROCEDURE — 87086 URINE CULTURE/COLONY COUNT: CPT | Performed by: PHYSICIAN ASSISTANT

## 2023-11-02 PROCEDURE — 87077 CULTURE AEROBIC IDENTIFY: CPT | Performed by: PHYSICIAN ASSISTANT

## 2023-11-10 ENCOUNTER — HOSPITAL ENCOUNTER (OUTPATIENT)
Dept: GENERAL RADIOLOGY | Facility: HOSPITAL | Age: 78
Discharge: HOME | End: 2023-11-10
Payer: COMMERCIAL

## 2023-11-10 ENCOUNTER — HOSPITAL ENCOUNTER (OUTPATIENT)
Dept: GENERAL RADIOLOGY | Facility: HOSPITAL | Age: 78
Discharge: HOME | End: 2023-11-10
Admitting: NURSE PRACTITIONER
Payer: COMMERCIAL

## 2023-11-10 ENCOUNTER — OFFICE VISIT (OUTPATIENT)
Dept: FAMILY MEDICINE CLINIC | Facility: CLINIC | Age: 78
End: 2023-11-10
Payer: COMMERCIAL

## 2023-11-10 VITALS
SYSTOLIC BLOOD PRESSURE: 142 MMHG | BODY MASS INDEX: 31.56 KG/M2 | DIASTOLIC BLOOD PRESSURE: 70 MMHG | HEIGHT: 62 IN | HEART RATE: 82 BPM | WEIGHT: 171.5 LBS | TEMPERATURE: 97.7 F | OXYGEN SATURATION: 96 %

## 2023-11-10 DIAGNOSIS — M79.644 CHRONIC PAIN OF RIGHT THUMB: ICD-10-CM

## 2023-11-10 DIAGNOSIS — M54.2 NECK PAIN: ICD-10-CM

## 2023-11-10 DIAGNOSIS — G89.29 CHRONIC PAIN OF RIGHT THUMB: ICD-10-CM

## 2023-11-10 DIAGNOSIS — N39.0 URINARY TRACT INFECTION IN FEMALE: Primary | ICD-10-CM

## 2023-11-10 DIAGNOSIS — M25.511 RIGHT SHOULDER PAIN, UNSPECIFIED CHRONICITY: ICD-10-CM

## 2023-11-10 LAB
BILIRUB BLD-MCNC: NEGATIVE MG/DL
CLARITY, POC: CLEAR
COLOR UR: YELLOW
EXPIRATION DATE: NORMAL
GLUCOSE UR STRIP-MCNC: NEGATIVE MG/DL
KETONES UR QL: NEGATIVE
LEUKOCYTE EST, POC: NEGATIVE
Lab: NORMAL
NITRITE UR-MCNC: NEGATIVE MG/ML
PH UR: 7 [PH] (ref 5–8)
PROT UR STRIP-MCNC: NEGATIVE MG/DL
RBC # UR STRIP: NEGATIVE /UL
SP GR UR: 1.01 (ref 1–1.03)
UROBILINOGEN UR QL: NORMAL

## 2023-11-10 PROCEDURE — 73130 X-RAY EXAM OF HAND: CPT

## 2023-11-10 PROCEDURE — 72050 X-RAY EXAM NECK SPINE 4/5VWS: CPT

## 2023-11-10 PROCEDURE — 81003 URINALYSIS AUTO W/O SCOPE: CPT | Performed by: NURSE PRACTITIONER

## 2023-11-10 PROCEDURE — 99214 OFFICE O/P EST MOD 30 MIN: CPT | Performed by: NURSE PRACTITIONER

## 2023-11-10 RX ORDER — MELOXICAM 7.5 MG/1
7.5 TABLET ORAL DAILY
Qty: 30 TABLET | Refills: 0 | Status: SHIPPED | OUTPATIENT
Start: 2023-11-10

## 2023-11-10 RX ORDER — CEPHALEXIN 500 MG/1
500 CAPSULE ORAL 2 TIMES DAILY
Qty: 14 CAPSULE | Refills: 0 | Status: SHIPPED | OUTPATIENT
Start: 2023-11-10 | End: 2023-11-17

## 2023-11-10 NOTE — PROGRESS NOTES
"Chief Complaint  Dysuria, Shoulder Pain (right), and Wrist Pain (Right)    Subjective          Estelle Gutierrez, 78 y.o. female presents to [facility]  History of Present Illness   Patient presents today for an acute visit.  She is a patient of CECILIA Goode.  She is complaining of UTI symptoms.  She states she went to [facility] urgent care on 11/2/2023 and was treated for UTI with Macrobid.  She developed a rash and has been having indigestion.  She also is complaining that her symptoms have returned.  Urgent care did run a urine culture.  Urinalysis in the office today but she is very symptomatic.    She is also complaining that she is still having issues with right neck, shoulder, arm, wrist and thumb pain for several months but getting worse.  She states she was afraid to take anything due to her UTI symptoms.       Tobacco Use: Medium Risk (11/10/2023)    Patient History     Smoking Tobacco Use: Former     Smokeless Tobacco Use: Never     Passive Exposure: Never      Objective   Vital Signs:   /70   Pulse 82   Temp 97.7 °F (36.5 °C)   Ht 157.5 cm (62\")   Wt 77.8 kg (171 lb 8 oz)   SpO2 96%   BMI 31.37 kg/m²       Current Outpatient Medications:     acetaminophen (TYLENOL) 650 MG 8 hr tablet, Take 1 tablet by mouth Every 8 (Eight) Hours As Needed., Disp: , Rfl:     amLODIPine (Norvasc) 5 MG tablet, Take 1 tablet by mouth Daily., Disp: 90 tablet, Rfl: 1    hydroCHLOROthiazide (HYDRODIURIL) 12.5 MG tablet, TAKE ONE TABLET BY MOUTH IN THE MORNING AND 1 TABLET AT 1pm, Disp: 180 tablet, Rfl: 1    Xiidra 5 % ophthalmic solution, Administer 1 drop to both eyes 2 (Two) Times a Day., Disp: , Rfl:     cephalexin (Keflex) 500 MG capsule, Take 1 capsule by mouth 2 (Two) Times a Day for 7 days., Disp: 14 capsule, Rfl: 0    meloxicam (Mobic) 7.5 MG tablet, Take 1 tablet by mouth Daily., Disp: 30 tablet, Rfl: 0   Past Medical History:   Diagnosis Date    " Arthritis     Cancer     Cataract 01/15/2022    right    Hypertension       Physical Exam  Vitals reviewed.   Constitutional:       Appearance: Normal appearance. She is well-developed.   Neck:      Thyroid: No thyroid mass, thyromegaly or thyroid tenderness.   Cardiovascular:      Rate and Rhythm: Normal rate and regular rhythm.      Heart sounds: No murmur heard.     No friction rub. No gallop.   Pulmonary:      Effort: Pulmonary effort is normal.      Breath sounds: Normal breath sounds. No wheezing or rhonchi.   Musculoskeletal:      Right shoulder: No tenderness. Decreased range of motion.      Right hand: No swelling or tenderness.      Cervical back: No tenderness.   Lymphadenopathy:      Cervical: No cervical adenopathy.   Skin:     General: Skin is warm and dry.   Neurological:      Mental Status: She is alert and oriented to person, place, and time.      Cranial Nerves: No cranial nerve deficit.   Psychiatric:         Mood and Affect: Mood and affect normal.         Behavior: Behavior normal.         Thought Content: Thought content normal. Thought content does not include homicidal or suicidal ideation.         Judgment: Judgment normal.        Result Review :   {The following data was reviewed by CECILIA Luther             Component  Ref Range & Units 08:51  (11/10/23) 8 d ago  (11/2/23) 1 yr ago  (9/29/22) 1 yr ago  (8/27/22)   Color  Yellow, Straw, Dark Yellow, Deann Yellow Dark Yellow Dark Yellow Yellow   Clarity, UA  Clear Clear Cloudy Abnormal  Cloudy Abnormal  Cloudy Abnormal    Specific Gravity  1.005 - 1.030 1.010 1.010 1.015 1.015   pH, Urine  5.0 - 8.0 7.0 7.0 6.5 7.0   Leukocytes  Negative Negative Small (1+) Abnormal  Moderate (2+) Abnormal  Moderate (2+) Abnormal    Nitrite, UA  Negative Negative Positive Abnormal  Positive Abnormal  Positive Abnormal    Protein, POC  Negative mg/dL Negative Negative Negative Negative   Glucose, UA  Negative mg/dL Negative Negative Negative  Negative   Ketones, UA  Negative Negative Negative Negative Negative   Urobilinogen, UA  Normal, 0.2 E.U./dL Normal 0.2 E.U./dL Normal 0.2 E.U./dL   Bilirubin  Negative Negative Negative Negative Negative   Blood, UA  Negative Negative Negative Trace Abnormal  Trace Abnormal    Lot Number [id number]           Urine Culture >100,000 CFU/mL Klebsiella pneumoniae ssp pneumoniae Abnormal          Colonization of the urinary tract without infection is common. Treatment is discouraged unless the patient is symptomatic, pregnant, or undergoing an invasive urologic procedure.        Resulting Agency: [facility]     Susceptibility       Klebsiella pneumoniae ssp pneumoniae     SHANIKA     Ampicillin <=2 ug/ml Resistant     Ampicillin + Sulbactam <=2 ug/ml Susceptible     Cefazolin <=4 ug/ml Susceptible     Cefepime <=1 ug/ml Susceptible     Ceftazidime <=1 ug/ml Susceptible     Ceftriaxone <=1 ug/ml Susceptible     Gentamicin <=1 ug/ml Susceptible     Levofloxacin <=0.12 ug/ml Susceptible     Nitrofurantoin <=16 ug/ml Susceptible     Piperacillin + Tazobactam <=4 ug/ml Susceptible     Trimethoprim + Sulfamethoxazole <=20 ug/ml Susceptible               CMP   CMP          12/2/2022    13:52 2/28/2023    08:55 8/28/2023    08:22   CMP   Glucose 88  91  108    BUN 10  11  14    Creatinine 0.75  0.75  0.73    EGFR 82.1  82.1  84.3    Sodium 136  139  138    Potassium 3.5  4.4  4.4    Chloride 97  104  102    Calcium 9.3  9.2  9.3    Total Protein 7.6  6.8  7.4    Albumin 4.70  4.0  4.1    Globulin 2.9  2.8  3.3    Total Bilirubin 0.4  0.3  0.4    Alkaline Phosphatase 92  88  92    AST (SGOT) 19  21  18    ALT (SGPT) 16  14  16    Albumin/Globulin Ratio 1.6  1.4  1.2    BUN/Creatinine Ratio 13.3  14.7  19.2    Anion Gap 11.5  7.6  11.8               Assessment and Plan    Diagnoses and all orders for this visit:    1. Urinary tract infection in female (Primary)  -     POCT urinalysis dipstick, automated  -     cephalexin (Keflex) 500 MG  capsule; Take 1 capsule by mouth 2 (Two) Times a Day for 7 days.  Dispense: 14 capsule; Refill: 0    2. Neck pain  -     XR Spine Cervical Complete 4 or 5 View; Future  -     meloxicam (Mobic) 7.5 MG tablet; Take 1 tablet by mouth Daily.  Dispense: 30 tablet; Refill: 0    3. Right shoulder pain, unspecified chronicity  -     XR Spine Cervical Complete 4 or 5 View; Future  -     meloxicam (Mobic) 7.5 MG tablet; Take 1 tablet by mouth Daily.  Dispense: 30 tablet; Refill: 0    4. Chronic pain of right thumb  -     XR Hand 3+ View Right; Future  -     meloxicam (Mobic) 7.5 MG tablet; Take 1 tablet by mouth Daily.  Dispense: 30 tablet; Refill: 0    Due to her UTI symptoms, I reviewed the urine culture and will treat her with Keflex for her current UTI.  Advised to increase water intake.  I will have her get x-rays of her neck and her right hand.  I will start her on meloxicam 7.5 mg once daily.  I will have her follow-up with me in 1 month or sooner if any new or worsening of symptoms.    Follow Up   Return in about 4 weeks (around 12/8/2023) for Next scheduled follow up RT thumb, neck and shoulder pain.  Patient was given instructions and counseling regarding her condition or for health maintenance advice. Please see specific information pulled into the AVS if appropriate.     Parts of this note are electronic transcriptions/translations of spoken language to printed text using the Dragon Dictation system.      Ying Eugene, CECILIA  11/10/2023

## 2023-12-06 ENCOUNTER — OFFICE VISIT (OUTPATIENT)
Dept: FAMILY MEDICINE CLINIC | Facility: CLINIC | Age: 78
End: 2023-12-06
Payer: MEDICARE

## 2023-12-06 VITALS
HEIGHT: 62 IN | SYSTOLIC BLOOD PRESSURE: 140 MMHG | BODY MASS INDEX: 31.94 KG/M2 | RESPIRATION RATE: 18 BRPM | DIASTOLIC BLOOD PRESSURE: 78 MMHG | HEART RATE: 76 BPM | WEIGHT: 173.6 LBS | TEMPERATURE: 97.4 F | OXYGEN SATURATION: 96 %

## 2023-12-06 DIAGNOSIS — M25.531 WRIST PAIN, CHRONIC, RIGHT: Primary | ICD-10-CM

## 2023-12-06 DIAGNOSIS — M65.4 DE QUERVAIN'S DISEASE (TENOSYNOVITIS): ICD-10-CM

## 2023-12-06 DIAGNOSIS — G89.29 WRIST PAIN, CHRONIC, RIGHT: Primary | ICD-10-CM

## 2023-12-06 DIAGNOSIS — Z23 NEED FOR INFLUENZA VACCINATION: ICD-10-CM

## 2023-12-06 RX ORDER — CLOBETASOL PROPIONATE 0.5 MG/G
OINTMENT TOPICAL
COMMUNITY
Start: 2023-11-10

## 2023-12-06 NOTE — PROGRESS NOTES
"Chief Complaint  Arthritis (1 month follow up) and Wrist Pain (Right wrist pain)    Subjective          Estelle Gutierrez presents to [facility]  History of Present Illness  She saw Ying and had xrays but her wrist is still hurting.  She said her neck is doing better.        Allergies  Shellfish-derived products, Macrobid [nitrofurantoin], and Other    Social History     Tobacco Use    Smoking status: Former     Packs/day: 1.00     Years: 36.00     Additional pack years: 0.00     Total pack years: 36.00     Types: Cigarettes     Quit date:      Years since quittin.9     Passive exposure: Never    Smokeless tobacco: Never   Vaping Use    Vaping Use: Never used   Substance Use Topics    Alcohol use: Not Currently    Drug use: Never       Family History   Problem Relation Age of Onset    Stroke Mother         90 yr old    Heart disease Mother     Heart disease Father     Other Father         heart attack death    Heart disease Brother         Health Maintenance Due   Topic Date Due    TDAP/TD VACCINES (1 - Tdap) Never done        Immunization History   Administered Date(s) Administered    COVID-19 (PFIZER) BIVALENT 12+YRS 2022    COVID-19 (PFIZER) Purple Cap Monovalent 2021, 2021, 2021    Flublok 18+yrs 10/18/2020    Fluzone High-Dose 65+yrs 10/10/2021, 2022, 2023       Review of Systems   Musculoskeletal:  Positive for arthralgias and myalgias.        Objective       Vitals:    23 1431 23 1450   BP: 148/80 140/78   Pulse: 76    Resp: 18    Temp: 97.4 °F (36.3 °C)    SpO2: 96%    Weight: 78.7 kg (173 lb 9.6 oz)    Height: 157.5 cm (62\")        Body mass index is 31.75 kg/m².         Physical Exam  Constitutional:       Appearance: Normal appearance.   HENT:      Head: Normocephalic.   Pulmonary:      Effort: Pulmonary effort is normal.   Skin:     Findings: No bruising.   Neurological:      General: No focal deficit present.      " Mental Status: She is alert and oriented to person, place, and time.   Psychiatric:         Mood and Affect: Mood normal.         Behavior: Behavior normal.         Thought Content: Thought content normal.         Judgment: Judgment normal.     There is pain noted with ROM with the De Quervain noted.          Result Review :     The following data was reviewed by: CECILIA Goode on 12/06/2023:                     Assessment and Plan      Diagnoses and all orders for this visit:    1. Wrist pain, chronic, right (Primary)  -     Diclofenac Sodium (Voltaren) 1 % gel gel; Apply 4 g topically to the appropriate area as directed 4 (Four) Times a Day As Needed (hand pain).  Dispense: 100 g; Refill: 1  -     lidocaine (XYLOCAINE) 2 % jelly; Apply  topically to the appropriate area as directed 4 (Four) Times a Day As Needed for Mild Pain or Moderate Pain.  Dispense: 85 g; Refill: 0    2. De Quervain's disease (tenosynovitis)  -     Diclofenac Sodium (Voltaren) 1 % gel gel; Apply 4 g topically to the appropriate area as directed 4 (Four) Times a Day As Needed (hand pain).  Dispense: 100 g; Refill: 1  -     lidocaine (XYLOCAINE) 2 % jelly; Apply  topically to the appropriate area as directed 4 (Four) Times a Day As Needed for Mild Pain or Moderate Pain.  Dispense: 85 g; Refill: 0    3. Need for influenza vaccination  -     Fluzone High-Dose 65+yrs        I spent 31 minutes caring for Estelle on this date of service. This time includes time spent by me in the following activities:preparing for the visit, reviewing tests, obtaining and/or reviewing a separately obtained history, performing a medically appropriate examination and/or evaluation , counseling and educating the patient/family/caregiver, ordering medications, tests, or procedures, and documenting information in the medical record    Follow Up     Return if symptoms worsen or fail to improve.  I did show her in depth the xrays to do for the wrist.  We will  trial the cream above and she knows to wash her hands afterwards.  If this does not help we will do a referral to ortho.  She does not drive.    Patient was given instructions and counseling regarding her condition or for health maintenance advice. Please see specific information pulled into the AVS if appropriate.     Parts of this note are electronic transcriptions/translations of spoken language to printed text using the Dragon Dictation system.          Karuna Rivers, CECILIA  12/06/2023

## 2024-01-05 ENCOUNTER — TELEPHONE (OUTPATIENT)
Dept: FAMILY MEDICINE CLINIC | Facility: CLINIC | Age: 79
End: 2024-01-05
Payer: COMMERCIAL

## 2024-01-05 DIAGNOSIS — G89.29 WRIST PAIN, CHRONIC, RIGHT: Primary | ICD-10-CM

## 2024-01-05 DIAGNOSIS — M25.531 WRIST PAIN, CHRONIC, RIGHT: Primary | ICD-10-CM

## 2024-01-05 DIAGNOSIS — M65.4 DE QUERVAIN'S DISEASE (TENOSYNOVITIS): ICD-10-CM

## 2024-01-05 NOTE — TELEPHONE ENCOUNTER
Caller: Estelle Gutierrez    Relationship: Self    Best call back number: [phone]     What is the best time to reach you: ANYTIME     Who are you requesting to speak with (clinical staff, provider,  specific staff member): CLINICAL     What was the call regarding: PATIENT IS CALLING WITH QUESTIONS, FOR POSSIBLE INJECTIONS AND ABOUT WITH .

## 2024-01-05 NOTE — TELEPHONE ENCOUNTER
Pt states she does not have questions she would just like you to refer her to his office for injections. States her meds help but not enough and she is still in great pain.

## 2024-01-22 ENCOUNTER — OFFICE VISIT (OUTPATIENT)
Dept: ORTHOPEDIC SURGERY | Facility: CLINIC | Age: 79
End: 2024-01-22
Payer: COMMERCIAL

## 2024-01-22 VITALS
HEART RATE: 77 BPM | BODY MASS INDEX: 31.83 KG/M2 | HEIGHT: 62 IN | OXYGEN SATURATION: 99 % | DIASTOLIC BLOOD PRESSURE: 68 MMHG | WEIGHT: 173 LBS | SYSTOLIC BLOOD PRESSURE: 141 MMHG

## 2024-01-22 DIAGNOSIS — M79.641 RIGHT HAND PAIN: Primary | ICD-10-CM

## 2024-01-22 DIAGNOSIS — M18.11 ARTHRITIS OF CARPOMETACARPAL (CMC) JOINT OF RIGHT THUMB: ICD-10-CM

## 2024-01-22 PROCEDURE — 20600 DRAIN/INJ JOINT/BURSA W/O US: CPT | Performed by: ORTHOPAEDIC SURGERY

## 2024-01-22 PROCEDURE — 99203 OFFICE O/P NEW LOW 30 MIN: CPT | Performed by: ORTHOPAEDIC SURGERY

## 2024-01-22 RX ORDER — LIDOCAINE HYDROCHLORIDE 10 MG/ML
1 INJECTION, SOLUTION INFILTRATION; PERINEURAL
Status: COMPLETED | OUTPATIENT
Start: 2024-01-22 | End: 2024-01-22

## 2024-01-22 RX ORDER — TRIAMCINOLONE ACETONIDE 40 MG/ML
40 INJECTION, SUSPENSION INTRA-ARTICULAR; INTRAMUSCULAR
Status: COMPLETED | OUTPATIENT
Start: 2024-01-22 | End: 2024-01-22

## 2024-01-22 RX ADMIN — LIDOCAINE HYDROCHLORIDE 1 ML: 10 INJECTION, SOLUTION INFILTRATION; PERINEURAL at 11:00

## 2024-01-22 RX ADMIN — TRIAMCINOLONE ACETONIDE 40 MG: 40 INJECTION, SUSPENSION INTRA-ARTICULAR; INTRAMUSCULAR at 11:00

## 2024-01-22 NOTE — PROGRESS NOTES
"Chief Complaint  Initial Evaluation of the Right Hand     Subjective      Estelle Gutierrez presents to [facility] for initial evaluation of the right hand.  She has pain at the base of her thumb.  She has difficulty with  and FMC tasks. She has had no recent injury.     Allergies   Allergen Reactions    Shellfish-Derived Products Unknown - Low Severity    Macrobid [Nitrofurantoin] Rash    Other Rash     Fertilizer on farm        Social History     Socioeconomic History    Marital status:    Tobacco Use    Smoking status: Former     Packs/day: 1.00     Years: 36.00     Additional pack years: 0.00     Total pack years: 36.00     Types: Cigarettes     Quit date:      Years since quittin.0     Passive exposure: Never    Smokeless tobacco: Never   Vaping Use    Vaping Use: Never used   Substance and Sexual Activity    Alcohol use: Not Currently    Drug use: Never    Sexual activity: Not Currently     Partners: Male        I reviewed the patient's chief complaint, history of present illness, review of systems, past medical history, surgical history, family history, social history, medications, and allergy list.     Review of Systems     Constitutional: Denies fevers, chills, weight loss  Cardiovascular: Denies chest pain, shortness of breath  Skin: Denies rashes, acute skin changes  Neurologic: Denies headache, loss of consciousness        Vital Signs:   /68   Pulse 77   Ht 157.5 cm (62\")   Wt 78.5 kg (173 lb)   SpO2 99%   BMI 31.64 kg/m²          Physical Exam  General: Alert. No acute distress    Ortho Exam        RIGHT HAND Negative Compression testing/ Negative Tinels. NegativeFinkelsteins. Negative Odonnell's testing. Positive CMC grind testing. Negative Phalens. Full ROM of the hand, fingers, elbow and wrist. Negative Triggering of the digit. Sensation grossly intact to light touch, median, radial and ulnar nerve. Positive AIN, PIN and ulnar nerve motor " function intact. Axillary nerve intact. Positive pulses.        Small Joint Arthrocentesis: R thumb CMC  Consent given by: patient  Site marked: site marked  Timeout: Immediately prior to procedure a time out was called to verify the correct patient, procedure, equipment, support staff and site/side marked as required   Supporting Documentation  Indications: pain   Procedure Details  Location: thumb - R thumb CMC  Preparation: Patient was prepped and draped in the usual sterile fashion  Needle size: 25 G  Medications administered: 1 mL lidocaine 1 %; 40 mg triamcinolone acetonide 40 MG/ML  Patient tolerance: patient tolerated the procedure well with no immediate complications          Imaging Results (Most Recent)       None             Result Review :             Assessment and Plan     Diagnoses and all orders for this visit:    1. Right hand pain (Primary)    2. Arthritis of carpometacarpal (CMC) joint of right thumb        Discussed the treatment plan with the patient.     Thumb brace and CMC arthritis of the right hand.     Discussed the risks and benefits of conservative measures.  The patient expressed understanding and wished to proceed with a right hand steroid injection.  He tolerated the injection well.       Call or return if worsening symptoms.    Follow Up     PRN      Patient was given instructions and counseling regarding her condition or for health maintenance advice. Please see specific information pulled into the AVS if appropriate.     Scribed for Oleksandr Presley MD by Latonia Avila MA.  01/22/24   10:59 EST    I have personally performed the services described in this document as scribed by the above individual and it is both accurate and complete. Oleksandr Presley MD 01/22/24

## 2024-02-29 ENCOUNTER — OFFICE VISIT (OUTPATIENT)
Dept: FAMILY MEDICINE CLINIC | Facility: CLINIC | Age: 79
End: 2024-02-29
Payer: MEDICARE

## 2024-02-29 VITALS
TEMPERATURE: 97.9 F | HEART RATE: 74 BPM | WEIGHT: 168.3 LBS | HEIGHT: 62 IN | BODY MASS INDEX: 30.97 KG/M2 | OXYGEN SATURATION: 97 % | RESPIRATION RATE: 16 BRPM | DIASTOLIC BLOOD PRESSURE: 80 MMHG | SYSTOLIC BLOOD PRESSURE: 142 MMHG

## 2024-02-29 DIAGNOSIS — Z00.00 MEDICARE ANNUAL WELLNESS VISIT, SUBSEQUENT: Primary | ICD-10-CM

## 2024-02-29 DIAGNOSIS — E78.2 MIXED HYPERLIPIDEMIA: ICD-10-CM

## 2024-02-29 DIAGNOSIS — I10 PRIMARY HYPERTENSION: ICD-10-CM

## 2024-02-29 PROBLEM — E66.811 OBESITY (BMI 30.0-34.9): Status: ACTIVE | Noted: 2024-02-29

## 2024-02-29 PROBLEM — E66.9 OBESITY (BMI 30.0-34.9): Status: ACTIVE | Noted: 2024-02-29

## 2024-02-29 LAB
ALBUMIN SERPL-MCNC: 4 G/DL (ref 3.5–5.2)
ALBUMIN/GLOB SERPL: 1.3 G/DL
ALP SERPL-CCNC: 102 U/L (ref 39–117)
ALT SERPL W P-5'-P-CCNC: 15 U/L (ref 1–33)
ANION GAP SERPL CALCULATED.3IONS-SCNC: 13 MMOL/L (ref 5–15)
AST SERPL-CCNC: 15 U/L (ref 1–32)
BASOPHILS # BLD AUTO: 0.05 10*3/MM3 (ref 0–0.2)
BASOPHILS NFR BLD AUTO: 0.6 % (ref 0–1.5)
BILIRUB SERPL-MCNC: 0.5 MG/DL (ref 0–1.2)
BUN SERPL-MCNC: 13 MG/DL (ref 8–23)
BUN/CREAT SERPL: 14.9 (ref 7–25)
CALCIUM SPEC-SCNC: 9.4 MG/DL (ref 8.6–10.5)
CHLORIDE SERPL-SCNC: 98 MMOL/L (ref 98–107)
CHOLEST SERPL-MCNC: 207 MG/DL (ref 0–200)
CO2 SERPL-SCNC: 27 MMOL/L (ref 22–29)
CREAT SERPL-MCNC: 0.87 MG/DL (ref 0.57–1)
DEPRECATED RDW RBC AUTO: 43.1 FL (ref 37–54)
EGFRCR SERPLBLD CKD-EPI 2021: 68.3 ML/MIN/1.73
EOSINOPHIL # BLD AUTO: 0.06 10*3/MM3 (ref 0–0.4)
EOSINOPHIL NFR BLD AUTO: 0.8 % (ref 0.3–6.2)
ERYTHROCYTE [DISTWIDTH] IN BLOOD BY AUTOMATED COUNT: 13.6 % (ref 12.3–15.4)
GLOBULIN UR ELPH-MCNC: 3.1 GM/DL
GLUCOSE SERPL-MCNC: 105 MG/DL (ref 65–99)
HCT VFR BLD AUTO: 42.1 % (ref 34–46.6)
HDLC SERPL-MCNC: 50 MG/DL (ref 40–60)
HGB BLD-MCNC: 13.8 G/DL (ref 12–15.9)
IMM GRANULOCYTES # BLD AUTO: 0.06 10*3/MM3 (ref 0–0.05)
IMM GRANULOCYTES NFR BLD AUTO: 0.8 % (ref 0–0.5)
LDLC SERPL CALC-MCNC: 128 MG/DL (ref 0–100)
LDLC/HDLC SERPL: 2.48 {RATIO}
LYMPHOCYTES # BLD AUTO: 2.21 10*3/MM3 (ref 0.7–3.1)
LYMPHOCYTES NFR BLD AUTO: 28.2 % (ref 19.6–45.3)
MCH RBC QN AUTO: 28.7 PG (ref 26.6–33)
MCHC RBC AUTO-ENTMCNC: 32.8 G/DL (ref 31.5–35.7)
MCV RBC AUTO: 87.5 FL (ref 79–97)
MONOCYTES # BLD AUTO: 0.74 10*3/MM3 (ref 0.1–0.9)
MONOCYTES NFR BLD AUTO: 9.4 % (ref 5–12)
NEUTROPHILS NFR BLD AUTO: 4.73 10*3/MM3 (ref 1.7–7)
NEUTROPHILS NFR BLD AUTO: 60.2 % (ref 42.7–76)
NRBC BLD AUTO-RTO: 0 /100 WBC (ref 0–0.2)
PLATELET # BLD AUTO: 322 10*3/MM3 (ref 140–450)
PMV BLD AUTO: 9.8 FL (ref 6–12)
POTASSIUM SERPL-SCNC: 4.2 MMOL/L (ref 3.5–5.2)
PROT SERPL-MCNC: 7.1 G/DL (ref 6–8.5)
RBC # BLD AUTO: 4.81 10*6/MM3 (ref 3.77–5.28)
SODIUM SERPL-SCNC: 138 MMOL/L (ref 136–145)
TRIGL SERPL-MCNC: 164 MG/DL (ref 0–150)
TSH SERPL DL<=0.05 MIU/L-ACNC: 2.38 UIU/ML (ref 0.27–4.2)
VLDLC SERPL-MCNC: 29 MG/DL (ref 5–40)
WBC NRBC COR # BLD AUTO: 7.85 10*3/MM3 (ref 3.4–10.8)

## 2024-02-29 PROCEDURE — 80061 LIPID PANEL: CPT | Performed by: NURSE PRACTITIONER

## 2024-02-29 PROCEDURE — 85025 COMPLETE CBC W/AUTO DIFF WBC: CPT | Performed by: NURSE PRACTITIONER

## 2024-02-29 PROCEDURE — 80053 COMPREHEN METABOLIC PANEL: CPT | Performed by: NURSE PRACTITIONER

## 2024-02-29 PROCEDURE — 84443 ASSAY THYROID STIM HORMONE: CPT | Performed by: NURSE PRACTITIONER

## 2024-02-29 RX ORDER — HYDROCHLOROTHIAZIDE 12.5 MG/1
TABLET ORAL
Qty: 180 TABLET | Refills: 1 | Status: SHIPPED | OUTPATIENT
Start: 2024-02-29

## 2024-02-29 RX ORDER — AMLODIPINE BESYLATE 5 MG/1
5 TABLET ORAL DAILY
Qty: 90 TABLET | Refills: 1 | Status: SHIPPED | OUTPATIENT
Start: 2024-02-29

## 2024-02-29 NOTE — PROGRESS NOTES
The ABCs of the Annual Wellness Visit  Subsequent Medicare Wellness Visit    Subjective    Estelle Gutierrez is a 78 y.o. female who presents for a Subsequent Medicare Wellness Visit.    The following portions of the patient's history were reviewed and   updated as appropriate: allergies, current medications, past family history, past medical history, past social history, past surgical history, and problem list.    Compared to one year ago, the patient feels her physical   health is the same.    Compared to one year ago, the patient feels her mental   health is the same.    Recent Hospitalizations:  She was not admitted to the hospital during the last year.       Current Medical Providers:  Patient Care Team:  Karuna Rivers APRN as PCP - General (Nurse Practitioner)    Outpatient Medications Prior to Visit   Medication Sig Dispense Refill    clobetasol (TEMOVATE) 0.05 % ointment APPLY A THIN LAYER TO THE AFFECTED AREAS EVERY DAY FOR 2-3 WEEKS.      Xiidra 5 % ophthalmic solution Administer 1 drop to both eyes 2 (Two) Times a Day.      amLODIPine (Norvasc) 5 MG tablet Take 1 tablet by mouth Daily. 90 tablet 1    hydroCHLOROthiazide (HYDRODIURIL) 12.5 MG tablet TAKE ONE TABLET BY MOUTH IN THE MORNING AND 1 TABLET AT 1pm 180 tablet 1    acetaminophen (TYLENOL) 650 MG 8 hr tablet Take 1 tablet by mouth Every 8 (Eight) Hours As Needed. (Patient not taking: Reported on 2/29/2024)      Diclofenac Sodium (Voltaren) 1 % gel gel Apply 4 g topically to the appropriate area as directed 4 (Four) Times a Day As Needed (hand pain). (Patient not taking: Reported on 2/29/2024) 100 g 1    lidocaine (XYLOCAINE) 2 % jelly Apply  topically to the appropriate area as directed 4 (Four) Times a Day As Needed for Mild Pain or Moderate Pain. (Patient not taking: Reported on 2/29/2024) 85 g 0    meloxicam (Mobic) 7.5 MG tablet Take 1 tablet by mouth Daily. (Patient not taking: Reported on 2/29/2024) 30 tablet 0     No  "facility-administered medications prior to visit.       No opioid medication identified on active medication list. I have reviewed chart for other potential  high risk medication/s and harmful drug interactions in the elderly.        Aspirin is not on active medication list.  Aspirin use is not indicated based on review of current medical condition/s. Risk of harm outweighs potential benefits.  .    Patient Active Problem List   Diagnosis    Arthritis    Basal cell carcinoma    Primary hypertension    Murmur    Screening for malignant neoplasm of colon    History of colonic polyps    Obesity (BMI 30.0-34.9)     Advance Care Planning   Advance Care Planning     Advance Directive is not on file.  ACP discussion was declined by the patient. Patient does not have an advance directive, information provided.     Objective    Vitals:    24 0934 24 0943   BP: 156/66 142/80   Pulse: 74    Resp: 16    Temp: 97.9 °F (36.6 °C)    SpO2: 97%    Weight: 76.3 kg (168 lb 4.8 oz)    Height: 157.5 cm (62\")      Estimated body mass index is 30.78 kg/m² as calculated from the following:    Height as of this encounter: 157.5 cm (62\").    Weight as of this encounter: 76.3 kg (168 lb 4.8 oz).    BMI is >= 30 and <35. (Class 1 Obesity). The following options were offered after discussion;: exercise counseling/recommendations and nutrition counseling/recommendations      Does the patient have evidence of cognitive impairment? No          HEALTH RISK ASSESSMENT    Smoking Status:  Social History     Tobacco Use   Smoking Status Former    Packs/day: 1.00    Years: 36.00    Additional pack years: 0.00    Total pack years: 36.00    Types: Cigarettes    Quit date:     Years since quittin.1    Passive exposure: Never   Smokeless Tobacco Never     Alcohol Consumption:  Social History     Substance and Sexual Activity   Alcohol Use Not Currently     Fall Risk Screen:    STEADI Fall Risk Assessment was completed, and patient is " at LOW risk for falls.Assessment completed on:2024    Depression Screenin/29/2024     9:40 AM   PHQ-2/PHQ-9 Depression Screening   Little Interest or Pleasure in Doing Things 0-->not at all   Feeling Down, Depressed or Hopeless 0-->not at all   PHQ-9: Brief Depression Severity Measure Score 0       Health Habits and Functional and Cognitive Screenin/29/2024     9:39 AM   Functional & Cognitive Status   Do you have difficulty preparing food and eating? No   Do you have difficulty bathing yourself, getting dressed or grooming yourself? No   Do you have difficulty using the toilet? No   Do you have difficulty moving around from place to place? No   Do you have trouble with steps or getting out of a bed or a chair? No   Current Diet Well Balanced Diet   Dental Exam Not up to date        Dental Exam Comment dentures   Eye Exam Up to date        Eye Exam Comment mary lou Rodriguez   Exercise (times per week) 0 times per week   Current Exercises Include No Regular Exercise   Do you need help using the phone?  No   Are you deaf or do you have serious difficulty hearing?  Yes   Do you need help to go to places out of walking distance? No   Do you need help shopping? No   Do you need help preparing meals?  No   Do you need help with housework?  No   Do you need help with laundry? No   Do you need help taking your medications? No   Do you need help managing money? No   Do you ever drive or ride in a car without wearing a seat belt? No   Have you felt unusual stress, anger or loneliness in the last month? No   Who do you live with? Spouse   If you need help, do you have trouble finding someone available to you? No   Have you been bothered in the last four weeks by sexual problems? No   Do you have difficulty concentrating, remembering or making decisions? No       Age-appropriate Screening Schedule:  Refer to the list below for future screening recommendations based on patient's age, sex and/or medical  conditions. Orders for these recommended tests are listed in the plan section. The patient has been provided with a written plan.    Health Maintenance   Topic Date Due    TDAP/TD VACCINES (1 - Tdap) 02/29/2024 (Originally 7/27/1964)    COVID-19 Vaccine (5 - 2023-24 season) 05/20/2024 (Originally 9/1/2023)    ZOSTER VACCINE (1 of 2) 08/28/2024 (Originally 7/27/1995)    RSV Vaccine - Adults (1 - 1-dose 60+ series) 02/28/2025 (Originally 7/27/2005)    Pneumococcal Vaccine 65+ (1 of 1 - PCV) 02/28/2025 (Originally 7/27/2010)    DXA SCAN  05/26/2024    LIPID PANEL  08/28/2024    ANNUAL WELLNESS VISIT  02/28/2025    BMI FOLLOWUP  02/28/2025    COLORECTAL CANCER SCREENING  07/28/2032    HEPATITIS C SCREENING  Completed    INFLUENZA VACCINE  Completed                  CMS Preventative Services Quick Reference  Risk Factors Identified During Encounter  Fall Risk-High or Moderate: Discussed Fall Prevention in the home  Immunizations Discussed/Encouraged: Tdap, Influenza, Prevnar 20 (Pneumococcal 20-valent conjugate), Shingrix, COVID19, and RSV (Respiratory Syncytial Virus)  Dental Screening Recommended  Vision Screening Recommended  The above risks/problems have been discussed with the patient.  Pertinent information has been shared with the patient in the After Visit Summary.  An After Visit Summary and PPPS were made available to the patient.    Follow Up:   Next Medicare Wellness visit to be scheduled in 1 year.       Additional E&M Note during same encounter follows:  Patient has multiple medical problems which are significant and separately identifiable that require additional work above and beyond the Medicare Wellness Visit.      Chief Complaint  Medicare Wellness-subsequent and Hypertension    Subjective        HPI  Estelle Gutierrez is also being seen today for   HTN:  She has been eating more salt with fresh veggie.  Norvasc at night and HCTZ in the AM sometimes.    LIPID:  No meds but has been doing treadmill.   "  Review of Systems   Constitutional:  Negative for fatigue.   Respiratory:  Negative for cough and shortness of breath.    Cardiovascular:  Negative for chest pain and leg swelling.   Gastrointestinal:  Negative for nausea and vomiting.   Psychiatric/Behavioral:  Negative for hallucinations and suicidal ideas.    Her right wrist is doing great at the shot.      Objective   Vital Signs:  /80   Pulse 74   Temp 97.9 °F (36.6 °C)   Resp 16   Ht 157.5 cm (62\")   Wt 76.3 kg (168 lb 4.8 oz)   SpO2 97%   BMI 30.78 kg/m²     Physical Exam  Vitals reviewed.   Constitutional:       Appearance: Normal appearance. She is well-developed.   Cardiovascular:      Rate and Rhythm: Normal rate and regular rhythm.      Heart sounds: Murmur heard.   Pulmonary:      Effort: Pulmonary effort is normal.      Breath sounds: Normal breath sounds.   Neurological:      Mental Status: She is alert and oriented to person, place, and time.      Cranial Nerves: No cranial nerve deficit.      Motor: No weakness.   Psychiatric:         Mood and Affect: Mood and affect normal.          The following data was reviewed by: CECILIA Goode on 02/29/2024:  Common labs          8/28/2023    08:22   Common Labs   Glucose 108    BUN 14    Creatinine 0.73    Sodium 138    Potassium 4.4    Chloride 102    Calcium 9.3    Albumin 4.1    Total Bilirubin 0.4    Alkaline Phosphatase 92    AST (SGOT) 18    ALT (SGPT) 16    WBC 6.62    Hemoglobin 14.2    Hematocrit 42.1    Platelets 312    Total Cholesterol 172    Triglycerides 139    HDL Cholesterol 45    LDL Cholesterol  102                 Assessment and Plan   Diagnoses and all orders for this visit:    1. Medicare annual wellness visit, subsequent (Primary)    2. Primary hypertension  -     amLODIPine (Norvasc) 5 MG tablet; Take 1 tablet by mouth Daily.  Dispense: 90 tablet; Refill: 1  -     hydroCHLOROthiazide 12.5 MG tablet; TAKE ONE TABLET BY MOUTH IN THE MORNING AND 1 TABLET AT 1pm  " Dispense: 180 tablet; Refill: 1  -     Comprehensive Metabolic Panel  -     CBC & Differential  -     TSH  -     Lipid Panel    3. Mixed hyperlipidemia             Follow Up   Return in about 6 months (around 8/29/2024).  Patient was given instructions and counseling regarding her condition or for health maintenance advice. Please see specific information pulled into the AVS if appropriate.   BP is doing good at home and cont to monitor and if the BP goes over 140/90's at anytime let me know.  Follow-up in 6 months for labs and appt. Call with any concerns or questions that you may have regarding your medications or history.    I have reviewed all medications and at this time no medications changes need to be adjusted for all chronic conditions.    Karuna Rivers, APRN  02/29/2024

## 2024-04-13 PROCEDURE — 87086 URINE CULTURE/COLONY COUNT: CPT

## 2024-04-13 PROCEDURE — 87186 SC STD MICRODIL/AGAR DIL: CPT

## 2024-04-13 PROCEDURE — 87077 CULTURE AEROBIC IDENTIFY: CPT

## 2024-08-21 ENCOUNTER — OFFICE VISIT (OUTPATIENT)
Dept: FAMILY MEDICINE CLINIC | Facility: CLINIC | Age: 79
End: 2024-08-21
Payer: MEDICARE

## 2024-08-21 VITALS
RESPIRATION RATE: 18 BRPM | DIASTOLIC BLOOD PRESSURE: 70 MMHG | SYSTOLIC BLOOD PRESSURE: 154 MMHG | OXYGEN SATURATION: 98 % | BODY MASS INDEX: 31.27 KG/M2 | WEIGHT: 169.9 LBS | HEIGHT: 62 IN | HEART RATE: 73 BPM | TEMPERATURE: 96.8 F

## 2024-08-21 DIAGNOSIS — R01.1 MURMUR: ICD-10-CM

## 2024-08-21 DIAGNOSIS — I10 PRIMARY HYPERTENSION: Primary | ICD-10-CM

## 2024-08-21 LAB
ALBUMIN SERPL-MCNC: 4.1 G/DL (ref 3.5–5.2)
ALBUMIN/GLOB SERPL: 1.2 G/DL
ALP SERPL-CCNC: 98 U/L (ref 39–117)
ALT SERPL W P-5'-P-CCNC: 16 U/L (ref 1–33)
ANION GAP SERPL CALCULATED.3IONS-SCNC: 11.3 MMOL/L (ref 5–15)
AST SERPL-CCNC: 22 U/L (ref 1–32)
BASOPHILS # BLD AUTO: 0.05 10*3/MM3 (ref 0–0.2)
BASOPHILS NFR BLD AUTO: 0.7 % (ref 0–1.5)
BILIRUB SERPL-MCNC: 0.3 MG/DL (ref 0–1.2)
BUN SERPL-MCNC: 10 MG/DL (ref 8–23)
BUN/CREAT SERPL: 13.5 (ref 7–25)
CALCIUM SPEC-SCNC: 9.4 MG/DL (ref 8.6–10.5)
CHLORIDE SERPL-SCNC: 101 MMOL/L (ref 98–107)
CHOLEST SERPL-MCNC: 188 MG/DL (ref 0–200)
CO2 SERPL-SCNC: 25.7 MMOL/L (ref 22–29)
CREAT SERPL-MCNC: 0.74 MG/DL (ref 0.57–1)
DEPRECATED RDW RBC AUTO: 42.7 FL (ref 37–54)
EGFRCR SERPLBLD CKD-EPI 2021: 82.4 ML/MIN/1.73
EOSINOPHIL # BLD AUTO: 0.07 10*3/MM3 (ref 0–0.4)
EOSINOPHIL NFR BLD AUTO: 1 % (ref 0.3–6.2)
ERYTHROCYTE [DISTWIDTH] IN BLOOD BY AUTOMATED COUNT: 13.6 % (ref 12.3–15.4)
GLOBULIN UR ELPH-MCNC: 3.3 GM/DL
GLUCOSE SERPL-MCNC: 106 MG/DL (ref 65–99)
HCT VFR BLD AUTO: 41.9 % (ref 34–46.6)
HDLC SERPL-MCNC: 41 MG/DL (ref 40–60)
HGB BLD-MCNC: 13.8 G/DL (ref 12–15.9)
IMM GRANULOCYTES # BLD AUTO: 0.06 10*3/MM3 (ref 0–0.05)
IMM GRANULOCYTES NFR BLD AUTO: 0.8 % (ref 0–0.5)
LDLC SERPL CALC-MCNC: 112 MG/DL (ref 0–100)
LDLC/HDLC SERPL: 2.62 {RATIO}
LYMPHOCYTES # BLD AUTO: 2.42 10*3/MM3 (ref 0.7–3.1)
LYMPHOCYTES NFR BLD AUTO: 33.5 % (ref 19.6–45.3)
MCH RBC QN AUTO: 28.6 PG (ref 26.6–33)
MCHC RBC AUTO-ENTMCNC: 32.9 G/DL (ref 31.5–35.7)
MCV RBC AUTO: 86.7 FL (ref 79–97)
MONOCYTES # BLD AUTO: 0.59 10*3/MM3 (ref 0.1–0.9)
MONOCYTES NFR BLD AUTO: 8.2 % (ref 5–12)
NEUTROPHILS NFR BLD AUTO: 4.03 10*3/MM3 (ref 1.7–7)
NEUTROPHILS NFR BLD AUTO: 55.8 % (ref 42.7–76)
NRBC BLD AUTO-RTO: 0 /100 WBC (ref 0–0.2)
PLATELET # BLD AUTO: 306 10*3/MM3 (ref 140–450)
PMV BLD AUTO: 10.1 FL (ref 6–12)
POTASSIUM SERPL-SCNC: 4.4 MMOL/L (ref 3.5–5.2)
PROT SERPL-MCNC: 7.4 G/DL (ref 6–8.5)
RBC # BLD AUTO: 4.83 10*6/MM3 (ref 3.77–5.28)
SODIUM SERPL-SCNC: 138 MMOL/L (ref 136–145)
TRIGL SERPL-MCNC: 198 MG/DL (ref 0–150)
TSH SERPL DL<=0.05 MIU/L-ACNC: 2.55 UIU/ML (ref 0.27–4.2)
VLDLC SERPL-MCNC: 35 MG/DL (ref 5–40)
WBC NRBC COR # BLD AUTO: 7.22 10*3/MM3 (ref 3.4–10.8)

## 2024-08-21 PROCEDURE — 1159F MED LIST DOCD IN RCRD: CPT | Performed by: NURSE PRACTITIONER

## 2024-08-21 PROCEDURE — 3078F DIAST BP <80 MM HG: CPT | Performed by: NURSE PRACTITIONER

## 2024-08-21 PROCEDURE — 84443 ASSAY THYROID STIM HORMONE: CPT | Performed by: NURSE PRACTITIONER

## 2024-08-21 PROCEDURE — 1160F RVW MEDS BY RX/DR IN RCRD: CPT | Performed by: NURSE PRACTITIONER

## 2024-08-21 PROCEDURE — 3077F SYST BP >= 140 MM HG: CPT | Performed by: NURSE PRACTITIONER

## 2024-08-21 PROCEDURE — 80061 LIPID PANEL: CPT | Performed by: NURSE PRACTITIONER

## 2024-08-21 PROCEDURE — 36415 COLL VENOUS BLD VENIPUNCTURE: CPT | Performed by: NURSE PRACTITIONER

## 2024-08-21 PROCEDURE — 80053 COMPREHEN METABOLIC PANEL: CPT | Performed by: NURSE PRACTITIONER

## 2024-08-21 PROCEDURE — G2211 COMPLEX E/M VISIT ADD ON: HCPCS | Performed by: NURSE PRACTITIONER

## 2024-08-21 PROCEDURE — 85025 COMPLETE CBC W/AUTO DIFF WBC: CPT | Performed by: NURSE PRACTITIONER

## 2024-08-21 PROCEDURE — 1126F AMNT PAIN NOTED NONE PRSNT: CPT | Performed by: NURSE PRACTITIONER

## 2024-08-21 PROCEDURE — 99214 OFFICE O/P EST MOD 30 MIN: CPT | Performed by: NURSE PRACTITIONER

## 2024-08-21 RX ORDER — LOSARTAN POTASSIUM 25 MG/1
25 TABLET ORAL DAILY
Qty: 90 TABLET | Refills: 1 | Status: SHIPPED | OUTPATIENT
Start: 2024-08-21

## 2024-08-21 RX ORDER — AMLODIPINE BESYLATE 5 MG/1
5 TABLET ORAL DAILY
Qty: 90 TABLET | Refills: 1 | Status: SHIPPED | OUTPATIENT
Start: 2024-08-21

## 2024-08-21 RX ORDER — CARBOXYMETHYLCELLULOSE SODIUM 5 MG/ML
1 SOLUTION/ DROPS OPHTHALMIC 3 TIMES DAILY PRN
COMMUNITY

## 2024-08-21 RX ORDER — HYDROCHLOROTHIAZIDE 12.5 MG/1
TABLET ORAL
Qty: 180 TABLET | Refills: 1 | Status: SHIPPED | OUTPATIENT
Start: 2024-08-21

## 2024-08-21 NOTE — PROGRESS NOTES
Chief Complaint  Hypertension    Subjective          Estelle Gutierrez presents to [facility]  History of Present Illness  HTN:  She has been eating more salt with fresh veggie.  Norvasc at night and HCTZ in the AM sometimes and taking the hydrochlorothiazide at 1:00 in the afternoon and sometimes it is up 2.    LIPID:  No meds but has been doing treadmill.      Depression: Not at risk (2024)    PHQ-2     PHQ-2 Score: 0    and 2024               Allergies  Shellfish-derived products, Macrobid [nitrofurantoin], and Other    Social History     Tobacco Use    Smoking status: Former     Current packs/day: 0.00     Average packs/day: 1 pack/day for 36.0 years (36.0 ttl pk-yrs)     Types: Cigarettes     Start date:      Quit date:      Years since quittin.6     Passive exposure: Never    Smokeless tobacco: Never   Vaping Use    Vaping status: Never Used   Substance Use Topics    Alcohol use: Not Currently    Drug use: Never       Family History   Problem Relation Age of Onset    Stroke Mother         90 yr old    Heart disease Mother     Heart disease Father     Other Father         heart attack death    Heart disease Brother         Health Maintenance Due   Topic Date Due    INFLUENZA VACCINE  2024        Immunization History   Administered Date(s) Administered    COVID-19 (PFIZER) BIVALENT 12+YRS 2022    COVID-19 (PFIZER) Purple Cap Monovalent 2021, 2021, 2021    Flublok 18+yrs 10/18/2020    Fluzone High-Dose 65+yrs 10/10/2021, 2022, 2023       Review of Systems   Constitutional:  Negative for fatigue.   Respiratory:  Negative for cough and shortness of breath.    Cardiovascular:  Negative for chest pain.   Gastrointestinal:  Negative for diarrhea, nausea and vomiting.        Objective       Vitals:    24 0949   BP: 154/70   Pulse: 73   Resp: 18   Temp: 96.8 °F (36 °C)   SpO2: 98%   Weight: 77.1 kg (169 lb 14.4 oz)  "  Height: 157.5 cm (62\")       Body mass index is 31.08 kg/m².         Physical Exam  Vitals reviewed.   Constitutional:       Appearance: Normal appearance. She is well-developed.   Cardiovascular:      Rate and Rhythm: Normal rate and regular rhythm.      Heart sounds: Normal heart sounds. Murmur heard.   Pulmonary:      Effort: Pulmonary effort is normal.      Breath sounds: Normal breath sounds.   Neurological:      Mental Status: She is alert and oriented to person, place, and time.      Cranial Nerves: No cranial nerve deficit.      Motor: No weakness.   Psychiatric:         Mood and Affect: Mood and affect normal.               Result Review :     The following data was reviewed by: CECILIA Goode on 08/21/2024:    Common Labs   Common labs          8/28/2023    08:22 2/29/2024    10:02   Common Labs   Glucose 108  105    BUN 14  13    Creatinine 0.73  0.87    Sodium 138  138    Potassium 4.4  4.2    Chloride 102  98    Calcium 9.3  9.4    Albumin 4.1  4.0    Total Bilirubin 0.4  0.5    Alkaline Phosphatase 92  102    AST (SGOT) 18  15    ALT (SGPT) 16  15    WBC 6.62  7.85    Hemoglobin 14.2  13.8    Hematocrit 42.1  42.1    Platelets 312  322    Total Cholesterol 172  207    Triglycerides 139  164    HDL Cholesterol 45  50    LDL Cholesterol  102  128            No Images in the past 120 days found..              Assessment and Plan      Assessment & Plan  Primary hypertension   we will go ahead and add in losartan in the mornings and we will go from there.  She will check her blood pressure daily and let me know in a couple weeks how it is doing.  Murmur  Declines to do echo    Orders Placed This Encounter   Procedures    Comprehensive Metabolic Panel    TSH    Lipid Panel    CBC Auto Differential    CBC & Differential     New Medications Ordered This Visit   Medications    amLODIPine (Norvasc) 5 MG tablet     Sig: Take 1 tablet by mouth Daily.     Dispense:  90 tablet     Refill:  1    " hydroCHLOROthiazide 12.5 MG tablet     Sig: TAKE ONE TABLET BY MOUTH IN THE MORNING AND 1 TABLET AT 1pm     Dispense:  180 tablet     Refill:  1    losartan (COZAAR) 25 MG tablet     Sig: Take 1 tablet by mouth Daily.     Dispense:  90 tablet     Refill:  1          Diagnosis Plan   1. Primary hypertension  Comprehensive Metabolic Panel    CBC & Differential    TSH    Lipid Panel    amLODIPine (Norvasc) 5 MG tablet    hydroCHLOROthiazide 12.5 MG tablet    losartan (COZAAR) 25 MG tablet      2. Murmur                  Follow Up     Return in about 6 months (around 2/21/2025) for Medicare Wellness 2/29/2024.  Follow-up in 6 months for labs and appt. Call with any concerns or questions that you may have regarding your medications or history.    I have reviewed all medications and at this time no medications changes need to be adjusted for all chronic conditions.    Patient was given instructions and counseling regarding her condition or for health maintenance advice. Please see specific information pulled into the AVS if appropriate.     Parts of this note are electronic transcriptions/translations of spoken language to printed text using the Dragon Dictation system.          Karuna Rivers, APRN  08/21/2024

## 2024-08-21 NOTE — ASSESSMENT & PLAN NOTE
we will go ahead and add in losartan in the mornings and we will go from there.  She will check her blood pressure daily and let me know in a couple weeks how it is doing.

## 2025-01-13 DIAGNOSIS — I10 PRIMARY HYPERTENSION: ICD-10-CM

## 2025-01-13 RX ORDER — LOSARTAN POTASSIUM 25 MG/1
25 TABLET ORAL DAILY
Qty: 90 TABLET | Refills: 1 | OUTPATIENT
Start: 2025-01-13

## 2025-01-17 DIAGNOSIS — I10 PRIMARY HYPERTENSION: ICD-10-CM

## 2025-01-17 RX ORDER — LOSARTAN POTASSIUM 25 MG/1
25 TABLET ORAL DAILY
Qty: 90 TABLET | Refills: 1 | OUTPATIENT
Start: 2025-01-17

## 2025-01-23 ENCOUNTER — TELEPHONE (OUTPATIENT)
Dept: FAMILY MEDICINE CLINIC | Facility: CLINIC | Age: 80
End: 2025-01-23
Payer: COMMERCIAL

## 2025-01-23 DIAGNOSIS — I10 PRIMARY HYPERTENSION: ICD-10-CM

## 2025-01-23 RX ORDER — LOSARTAN POTASSIUM 25 MG/1
25 TABLET ORAL DAILY
Qty: 30 TABLET | Refills: 0 | Status: SHIPPED | OUTPATIENT
Start: 2025-01-23

## 2025-01-23 NOTE — TELEPHONE ENCOUNTER
"Relay     \"Pt should not be out of this medication until next month. On 08/21/24 a 90 day supply was sent and 1 refill enough to last to 2/21/25. Pts nexy f/u is not until 3/3/25 so she will be just a few days shy. Sent a 30 day supply to [facility] to get her through until her apt.\"                "

## 2025-01-23 NOTE — TELEPHONE ENCOUNTER
Caller: Estelle Gutierrez    Relationship to patient: Self    Best call back number: [phone]    Patient is needing: PATIENT WOULD LIKE TO KNOW WHY LOSARTAN WAS NOT FILLED.         CONTACT PATIENT TO ADVISE.

## 2025-02-24 DIAGNOSIS — I10 PRIMARY HYPERTENSION: ICD-10-CM

## 2025-02-24 NOTE — TELEPHONE ENCOUNTER
Caller: Matt Estelle MERRILL    Relationship: Self    Best call back number: [phone]     Requested Prescriptions:   Requested Prescriptions     Pending Prescriptions Disp Refills    losartan (COZAAR) 25 MG tablet 30 tablet 0     Sig: Take 1 tablet by mouth Daily.        Pharmacy where request should be sent: [place], [place] - [address] - [phone] PH - [phone] FX     Last office visit with prescribing clinician: 8/21/2024   Last telemedicine visit with prescribing clinician: Visit date not found   Next office visit with prescribing clinician: 3/3/2025     Additional details provided by patient: HAS 2 PILLS LEFT    Does the patient have less than a 3 day supply:  [x] Yes  [] No    Would you like a call back once the refill request has been completed: [x] Yes [] No    If the office needs to give you a call back, can they leave a voicemail: [x] Yes [] No    Michele Joya Rep   02/24/25 09:32 EST

## 2025-02-25 RX ORDER — LOSARTAN POTASSIUM 25 MG/1
25 TABLET ORAL DAILY
Qty: 14 TABLET | Refills: 0 | Status: SHIPPED | OUTPATIENT
Start: 2025-02-25 | End: 2025-03-03 | Stop reason: SDUPTHER

## 2025-03-03 ENCOUNTER — OFFICE VISIT (OUTPATIENT)
Dept: FAMILY MEDICINE CLINIC | Facility: CLINIC | Age: 80
End: 2025-03-03
Payer: COMMERCIAL

## 2025-03-03 VITALS
BODY MASS INDEX: 31.65 KG/M2 | TEMPERATURE: 97.2 F | HEART RATE: 78 BPM | OXYGEN SATURATION: 96 % | HEIGHT: 62 IN | RESPIRATION RATE: 16 BRPM | SYSTOLIC BLOOD PRESSURE: 138 MMHG | DIASTOLIC BLOOD PRESSURE: 74 MMHG | WEIGHT: 172 LBS

## 2025-03-03 DIAGNOSIS — G89.29 CHRONIC PAIN OF BOTH KNEES: ICD-10-CM

## 2025-03-03 DIAGNOSIS — Z00.00 MEDICARE ANNUAL WELLNESS VISIT, SUBSEQUENT: Primary | ICD-10-CM

## 2025-03-03 DIAGNOSIS — E78.2 MIXED HYPERLIPIDEMIA: ICD-10-CM

## 2025-03-03 DIAGNOSIS — M25.561 CHRONIC PAIN OF BOTH KNEES: ICD-10-CM

## 2025-03-03 DIAGNOSIS — I10 PRIMARY HYPERTENSION: ICD-10-CM

## 2025-03-03 DIAGNOSIS — M25.562 CHRONIC PAIN OF BOTH KNEES: ICD-10-CM

## 2025-03-03 LAB
ALBUMIN SERPL-MCNC: 4.1 G/DL (ref 3.5–5.2)
ALBUMIN/GLOB SERPL: 1.2 G/DL
ALP SERPL-CCNC: 98 U/L (ref 39–117)
ALT SERPL W P-5'-P-CCNC: 13 U/L (ref 1–33)
ANION GAP SERPL CALCULATED.3IONS-SCNC: 12.1 MMOL/L (ref 5–15)
AST SERPL-CCNC: 19 U/L (ref 1–32)
BASOPHILS # BLD AUTO: 0.03 10*3/MM3 (ref 0–0.2)
BASOPHILS NFR BLD AUTO: 0.5 % (ref 0–1.5)
BILIRUB SERPL-MCNC: 0.5 MG/DL (ref 0–1.2)
BUN SERPL-MCNC: 11 MG/DL (ref 8–23)
BUN/CREAT SERPL: 14.9 (ref 7–25)
CALCIUM SPEC-SCNC: 9.4 MG/DL (ref 8.6–10.5)
CHLORIDE SERPL-SCNC: 99 MMOL/L (ref 98–107)
CHOLEST SERPL-MCNC: 203 MG/DL (ref 0–200)
CO2 SERPL-SCNC: 25.9 MMOL/L (ref 22–29)
CREAT SERPL-MCNC: 0.74 MG/DL (ref 0.57–1)
DEPRECATED RDW RBC AUTO: 40.1 FL (ref 37–54)
EGFRCR SERPLBLD CKD-EPI 2021: 82.4 ML/MIN/1.73
EOSINOPHIL # BLD AUTO: 0.06 10*3/MM3 (ref 0–0.4)
EOSINOPHIL NFR BLD AUTO: 1 % (ref 0.3–6.2)
ERYTHROCYTE [DISTWIDTH] IN BLOOD BY AUTOMATED COUNT: 13 % (ref 12.3–15.4)
GLOBULIN UR ELPH-MCNC: 3.3 GM/DL
GLUCOSE SERPL-MCNC: 100 MG/DL (ref 65–99)
HCT VFR BLD AUTO: 40.7 % (ref 34–46.6)
HDLC SERPL-MCNC: 40 MG/DL (ref 40–60)
HGB BLD-MCNC: 13.8 G/DL (ref 12–15.9)
IMM GRANULOCYTES # BLD AUTO: 0.02 10*3/MM3 (ref 0–0.05)
IMM GRANULOCYTES NFR BLD AUTO: 0.3 % (ref 0–0.5)
LDLC SERPL CALC-MCNC: 122 MG/DL (ref 0–100)
LDLC/HDLC SERPL: 2.91 {RATIO}
LYMPHOCYTES # BLD AUTO: 2.18 10*3/MM3 (ref 0.7–3.1)
LYMPHOCYTES NFR BLD AUTO: 36 % (ref 19.6–45.3)
MCH RBC QN AUTO: 29.6 PG (ref 26.6–33)
MCHC RBC AUTO-ENTMCNC: 33.9 G/DL (ref 31.5–35.7)
MCV RBC AUTO: 87.3 FL (ref 79–97)
MONOCYTES # BLD AUTO: 0.59 10*3/MM3 (ref 0.1–0.9)
MONOCYTES NFR BLD AUTO: 9.7 % (ref 5–12)
NEUTROPHILS NFR BLD AUTO: 3.18 10*3/MM3 (ref 1.7–7)
NEUTROPHILS NFR BLD AUTO: 52.5 % (ref 42.7–76)
NRBC BLD AUTO-RTO: 0 /100 WBC (ref 0–0.2)
PLATELET # BLD AUTO: 304 10*3/MM3 (ref 140–450)
PMV BLD AUTO: 9.6 FL (ref 6–12)
POTASSIUM SERPL-SCNC: 4 MMOL/L (ref 3.5–5.2)
PROT SERPL-MCNC: 7.4 G/DL (ref 6–8.5)
RBC # BLD AUTO: 4.66 10*6/MM3 (ref 3.77–5.28)
SODIUM SERPL-SCNC: 137 MMOL/L (ref 136–145)
TRIGL SERPL-MCNC: 233 MG/DL (ref 0–150)
TSH SERPL DL<=0.05 MIU/L-ACNC: 2.49 UIU/ML (ref 0.27–4.2)
VLDLC SERPL-MCNC: 41 MG/DL (ref 5–40)
WBC NRBC COR # BLD AUTO: 6.06 10*3/MM3 (ref 3.4–10.8)

## 2025-03-03 PROCEDURE — 80061 LIPID PANEL: CPT | Performed by: NURSE PRACTITIONER

## 2025-03-03 PROCEDURE — 80050 GENERAL HEALTH PANEL: CPT | Performed by: NURSE PRACTITIONER

## 2025-03-03 RX ORDER — AMLODIPINE BESYLATE 5 MG/1
5 TABLET ORAL DAILY
Qty: 90 TABLET | Refills: 1 | Status: SHIPPED | OUTPATIENT
Start: 2025-03-03

## 2025-03-03 RX ORDER — LOSARTAN POTASSIUM 25 MG/1
25 TABLET ORAL DAILY
Qty: 14 TABLET | Refills: 0 | Status: SHIPPED | OUTPATIENT
Start: 2025-03-03

## 2025-03-03 RX ORDER — HYDROCHLOROTHIAZIDE 12.5 MG/1
TABLET ORAL
Qty: 180 TABLET | Refills: 1 | Status: SHIPPED | OUTPATIENT
Start: 2025-03-03

## 2025-03-03 NOTE — PROGRESS NOTES
Subjective   The ABCs of the Annual Wellness Visit  Medicare Wellness Visit      Estelle Gutierrez is a 79 y.o. patient who presents for a Medicare Wellness Visit.    The following portions of the patient's history were reviewed and   updated as appropriate: allergies, current medications, past family history, past medical history, past social history, past surgical history, and problem list.    Compared to one year ago, the patient's physical   health is the same.  Compared to one year ago, the patient's mental   health is the same.    Recent Hospitalizations:  She was not admitted to the hospital during the last year.     Current Medical Providers:  Patient Care Team:  Karuna Rivers APRN as PCP - General (Nurse Practitioner)    Outpatient Medications Prior to Visit   Medication Sig Dispense Refill    carboxymethylcellulose (REFRESH PLUS) 0.5 % solution Administer 1 drop to both eyes 3 (Three) Times a Day As Needed for Dry Eyes.      amLODIPine (Norvasc) 5 MG tablet Take 1 tablet by mouth Daily. 90 tablet 1    hydroCHLOROthiazide 12.5 MG tablet TAKE ONE TABLET BY MOUTH IN THE MORNING AND 1 TABLET AT 1pm 180 tablet 1    losartan (COZAAR) 25 MG tablet Take 1 tablet by mouth Daily. 14 tablet 0    clobetasol (TEMOVATE) 0.05 % ointment APPLY A THIN LAYER TO THE AFFECTED AREAS EVERY DAY FOR 2-3 WEEKS. (Patient not taking: Reported on 3/3/2025)       No facility-administered medications prior to visit.     No opioid medication identified on active medication list. I have reviewed chart for other potential  high risk medication/s and harmful drug interactions in the elderly.      Aspirin is not on active medication list.  Aspirin use is not indicated based on review of current medical condition/s. Risk of harm outweighs potential benefits.  .    Patient Active Problem List   Diagnosis    Arthritis    Basal cell carcinoma    Primary hypertension    Murmur    Screening for malignant neoplasm of colon    History of  "colonic polyps    Obesity (BMI 30.0-34.9)     Advance Care Planning Advance Directive is not on file.  ACP discussion was declined by the patient. Patient does not have an advance directive, information provided.            Objective   Vitals:    25 0938 25 0946   BP: 147/75 138/74   Pulse: 78    Resp: 16    Temp: 97.2 °F (36.2 °C)    SpO2: 96%    Weight: 78 kg (172 lb)    Height: 157.5 cm (62\")    PainSc: 6     PainLoc: Generalized        Estimated body mass index is 31.46 kg/m² as calculated from the following:    Height as of this encounter: 157.5 cm (62\").    Weight as of this encounter: 78 kg (172 lb).    BMI is >= 30 and <35. (Class 1 Obesity). The following options were offered after discussion;: exercise counseling/recommendations and nutrition counseling/recommendations           Does the patient have evidence of cognitive impairment? No                                                                                                Health  Risk Assessment    Smoking Status:  Social History     Tobacco Use   Smoking Status Former    Current packs/day: 0.00    Average packs/day: 1 pack/day for 36.0 years (36.0 ttl pk-yrs)    Types: Cigarettes    Start date:     Quit date:     Years since quittin.1    Passive exposure: Never   Smokeless Tobacco Never     Alcohol Consumption:  Social History     Substance and Sexual Activity   Alcohol Use Not Currently       Fall Risk Screen  STEADI Fall Risk Assessment was completed, and patient is at LOW risk for falls.Assessment completed on:3/3/2025    Depression Screening   Little interest or pleasure in doing things? Not at all   Feeling down, depressed, or hopeless? Not at all   PHQ-2 Total Score 0      Health Habits and Functional and Cognitive Screening:      3/3/2025     9:40 AM   Functional & Cognitive Status   Do you have difficulty preparing food and eating? No   Do you have difficulty bathing yourself, getting dressed or grooming yourself? " No   Do you have difficulty using the toilet? No   Do you have difficulty moving around from place to place? No   Do you have trouble with steps or getting out of a bed or a chair? No   Current Diet Well Balanced Diet   Dental Exam Not up to date        Dental Exam Comment dentures   Eye Exam Up to date        Eye Exam Comment Venkat Rodriguez   Exercise (times per week) 0 times per week   Current Exercises Include No Regular Exercise   Do you need help using the phone?  No   Are you deaf or do you have serious difficulty hearing?  Yes   Do you need help to go to places out of walking distance? No   Do you need help shopping? No   Do you need help preparing meals?  No   Do you need help with housework?  No   Do you need help with laundry? No   Do you need help taking your medications? No   Do you need help managing money? No   Do you ever drive or ride in a car without wearing a seat belt? No   Have you felt unusual stress, anger or loneliness in the last month? No   Who do you live with? Spouse   If you need help, do you have trouble finding someone available to you? No   Have you been bothered in the last four weeks by sexual problems? No   Do you have difficulty concentrating, remembering or making decisions? No           Age-appropriate Screening Schedule:  Refer to the list below for future screening recommendations based on patient's age, sex and/or medical conditions. Orders for these recommended tests are listed in the plan section. The patient has been provided with a written plan.    Health Maintenance List  Health Maintenance   Topic Date Due    Pneumococcal Vaccine 50+ (1 of 1 - PCV) Never done    BMI FOLLOWUP  02/28/2025    DXA SCAN  03/03/2025 (Originally 5/26/2024)    TDAP/TD VACCINES (1 - Tdap) 03/03/2025 (Originally 7/27/1964)    ZOSTER VACCINE (1 of 2) 03/03/2025 (Originally 7/27/1995)    RSV Vaccine - Adults (1 - 1-dose 75+ series) 04/03/2025 (Originally 7/27/2020)    COVID-19 Vaccine (5 - 2024-25  season) 05/23/2025 (Originally 9/1/2024)    LIPID PANEL  08/21/2025    ANNUAL WELLNESS VISIT  03/03/2026    COLORECTAL CANCER SCREENING  07/28/2032    HEPATITIS C SCREENING  Completed    INFLUENZA VACCINE  Completed                                                                                                                                                CMS Preventative Services Quick Reference  Risk Factors Identified During Encounter  Immunizations Discussed/Encouraged: Prevnar 20 (Pneumococcal 20-valent conjugate)  Dental Screening Recommended  Vision Screening Recommended    The above risks/problems have been discussed with the patient.  Pertinent information has been shared with the patient in the After Visit Summary.  An After Visit Summary and PPPS were made available to the patient.    Follow Up:   Next Medicare Wellness visit to be scheduled in 1 year.         Additional E&M Note during same encounter follows:  Patient has additional, significant, and separately identifiable condition(s)/problem(s) that require work above and beyond the Medicare Wellness Visit     Chief Complaint  Medicare Wellness-subsequent and Hypertension    Subjective    HPI  Estelle is also being seen today for additional medical problem/s.       The patient presents for evaluation of bilateral knee pain, hypertension, cold feet, and health maintenance.    She reports a general sense of well-being, with the exception of persistent bilateral knee pain. She is considering seeking medical intervention in the form of gel injections. The severity of her pain has prevented her from resuming treadmill exercises.    She continues to take amlodipine, hydrochlorothiazide, and losartan. Her blood pressure readings at home have been consistently within the range of 112 to 114 systolic and 70 to 71 diastolic, with a heart rate of 81. She is not experiencing any chest pain or shortness of breath.    She experiences difficulty sleeping due to cold  "feet, which persist even when wearing socks.  She will try white rice and a sock and place in the microwave for no more than 20 seconds then placed under the sheet to have the warmth versus having an electric blanket on.    She does not take any cholesterol medicine.    She does not have a living will. Her  is her emergency contact, and her daughter is there if she needs her. She is up to date on the vaccines that she wishes to get. She has not taken the pneumonia vaccine. She is up to date on her colonoscopy screening, which was done in 2022, and was told she would not need another one for 10 years.    MEDICATIONS  Amlodipine, hydrochlorothiazide, losartan.    IMMUNIZATIONS  She is up to date on the vaccines that she wishes to get. She has not taken the pneumonia vaccine.  Review of Systems   Constitutional:  Negative for fatigue.   Respiratory:  Negative for cough and shortness of breath.    Cardiovascular:  Negative for chest pain and leg swelling.   Gastrointestinal:  Negative for nausea and vomiting.   Musculoskeletal:  Positive for arthralgias.   Psychiatric/Behavioral:  Negative for hallucinations and suicidal ideas.           Objective   Vital Signs:  /74   Pulse 78   Temp 97.2 °F (36.2 °C)   Resp 16   Ht 157.5 cm (62\")   Wt 78 kg (172 lb)   SpO2 96%   BMI 31.46 kg/m²   Physical Exam  Vitals reviewed.   Constitutional:       Appearance: Normal appearance. She is well-developed.   Cardiovascular:      Rate and Rhythm: Normal rate and regular rhythm.      Heart sounds: Murmur heard.   Pulmonary:      Effort: Pulmonary effort is normal.      Breath sounds: Normal breath sounds.   Neurological:      Mental Status: She is alert and oriented to person, place, and time.      Cranial Nerves: No cranial nerve deficit.      Motor: No weakness.   Psychiatric:         Mood and Affect: Mood and affect normal.             Vital Signs  Blood pressure is in the range of 130s over 70s.    The following " data was reviewed by: CECILIA Goode on 03/03/2025:    Common labs          8/21/2024    10:06   Common Labs   Glucose 106    BUN 10    Creatinine 0.74    Sodium 138    Potassium 4.4    Chloride 101    Calcium 9.4    Albumin 4.1    Total Bilirubin 0.3    Alkaline Phosphatase 98    AST (SGOT) 22    ALT (SGPT) 16    WBC 7.22    Hemoglobin 13.8    Hematocrit 41.9    Platelets 306    Total Cholesterol 188    Triglycerides 198    HDL Cholesterol 41    LDL Cholesterol  112        Results                Assessment and Plan        1. Bilateral knee pain.  A referral will be initiated for gel injections in both knees to alleviate the discomfort.    2. Hypertension.  Her blood pressure is well-regulated, with readings consistently in the range of 130s over 70s. She is currently taking amlodipine, hydrochlorothiazide, and losartan.    3. Cold feet.  She has been advised to use a sock filled with rice, heated in the microwave for no more than 20 seconds, to warm her feet before sleep.    4. Health maintenance.  She is up to date on her colonoscopy screening, which was done in 2022, and was told she would not need another one for 10 years. She has been provided with information regarding the creation of a living will. She has been informed about the availability of the pneumonia vaccine and can receive it at any time if she chooses. A comprehensive fasting blood work will be conducted to assess cholesterol levels, kidney function, and liver function.    PROCEDURE  Colonoscopy was performed in 2022.      Diagnosis Plan   1. Medicare annual wellness visit, subsequent        2. Primary hypertension  Comprehensive Metabolic Panel    CBC & Differential    TSH    Lipid Panel    amLODIPine (Norvasc) 5 MG tablet    hydroCHLOROthiazide 12.5 MG tablet    losartan (COZAAR) 25 MG tablet      3. Chronic pain of both knees  Ambulatory Referral to Orthopedic Surgery      4. Mixed hyperlipidemia  Comprehensive Metabolic Panel    CBC  & Differential    TSH    Lipid Panel               Follow Up   Return in about 6 months (around 9/3/2025).  Patient was given instructions and counseling regarding her condition or for health maintenance advice. Please see specific information pulled into the AVS if appropriate.  Patient or patient representative verbalized consent for the use of Ambient Listening during the visit with  CECILIA Goode for chart documentation. 3/3/2025  09:54 EST

## 2025-03-03 NOTE — PATIENT INSTRUCTIONS
Advance Directive    Advance directives are legal papers that state your wishes about health care decisions. They let your wishes be known to family, friends, and health care providers if you become unable to speak for yourself.   You should write these papers out over time rather than all at once. They can be changed and updated at any time.  The types of advance directives include:  Medical power of  (POA).  Living whit[place].  Do not resuscitate (DNR) or do not attempt resuscitation (DNAR) orders.  What are a health care proxy and medical POA?  A health care proxy is also called a health care agent. It's a person you choose to make medical decisions for you when you can't make them for yourself. In most cases, a proxy is a trusted friend or family member.  A medical POA is legal paperwork that names your proxy. It may need to be:  Signed.  Notarized.  Dated.  Copied.  Witnessed.  Added to your medical record.  You may also want to choose someone to handle your money if you can't do so. This is called a durable POA for finances. It's separate from a medical POA. You may choose your health care proxy or someone else to act as your agent in money matters.  If you don't have a proxy, or if the proxy may not be acting in your best interest, a court may choose a guardian to act on your behalf.  What is a living will?  A living will is legal paperwork that states your wishes about medical care. Providers should keep a copy of it in your medical record. You may want to give a copy to family members or friends. You can also keep a card in your wallet to let loved ones know you have a living will and where they can find it. A living will may be used if:  You're very sick with something that will end your life.  You become disabled.  You can't make decisions or speak for yourself.  Your living will should include whether:  To use or not use life support equipment. This may include machines to filter your blood or to help  you breathe.  You want a DNR or DNAR order. This tells providers not to use CPR if your heart or breathing stops.  To use or not use tube feeding.  You want to be given foods and fluids.  You want a type of comfort care called palliative care. This may be given when the goal for treatment becomes comfort rather than a cure.  You want to donate your organs and tissues.  A living will doesn't say what to do with your money and property if you pass away.  What is a DNR or DNAR?  A DNR or DNAR order is a request not to have CPR. If you don't have one of these orders, a provider will try to help you if your heart stops or you stop breathing.   If you plan to have surgery, talk with your provider about your DNR or DNAR order.  What happens if I don't have an advance directive?  Each state has its own laws about advance directives. Some states assign family decision makers to act on your behalf if you don't have an advance directive.   Check with your provider, , or state representative about the laws in your state.  Where to find more information  Each state has its own laws about advance directives. You can look up these laws at: [email or web address]  This information is not intended to replace advice given to you by your health care provider. Make sure you discuss any questions you have with your health care provider.  Document Revised: 05/06/2024 Document Reviewed: 05/06/2024  Elsevier Patient Education © 2024 [employer]

## 2025-03-06 RX ORDER — ATORVASTATIN CALCIUM 20 MG/1
20 TABLET, FILM COATED ORAL DAILY
Qty: 90 TABLET | Refills: 1 | Status: SHIPPED | OUTPATIENT
Start: 2025-03-06

## 2025-03-12 DIAGNOSIS — I10 PRIMARY HYPERTENSION: ICD-10-CM

## 2025-03-12 RX ORDER — LOSARTAN POTASSIUM 25 MG/1
25 TABLET ORAL DAILY
Qty: 90 TABLET | Refills: 1 | Status: SHIPPED | OUTPATIENT
Start: 2025-03-12

## 2025-03-14 ENCOUNTER — OFFICE VISIT (OUTPATIENT)
Dept: ORTHOPEDIC SURGERY | Facility: CLINIC | Age: 80
End: 2025-03-14
Payer: COMMERCIAL

## 2025-03-14 VITALS
HEART RATE: 73 BPM | BODY MASS INDEX: 31.65 KG/M2 | OXYGEN SATURATION: 96 % | HEIGHT: 62 IN | WEIGHT: 172 LBS | SYSTOLIC BLOOD PRESSURE: 128 MMHG | DIASTOLIC BLOOD PRESSURE: 76 MMHG

## 2025-03-14 DIAGNOSIS — M18.11 ARTHRITIS OF CARPOMETACARPAL (CMC) JOINT OF RIGHT THUMB: ICD-10-CM

## 2025-03-14 DIAGNOSIS — M79.641 RIGHT HAND PAIN: Primary | ICD-10-CM

## 2025-03-14 RX ORDER — LIDOCAINE HYDROCHLORIDE 10 MG/ML
1 INJECTION, SOLUTION INFILTRATION; PERINEURAL
Status: COMPLETED | OUTPATIENT
Start: 2025-03-14 | End: 2025-03-14

## 2025-03-14 RX ORDER — TRIAMCINOLONE ACETONIDE 40 MG/ML
40 INJECTION, SUSPENSION INTRA-ARTICULAR; INTRAMUSCULAR
Status: COMPLETED | OUTPATIENT
Start: 2025-03-14 | End: 2025-03-14

## 2025-03-14 RX ADMIN — TRIAMCINOLONE ACETONIDE 40 MG: 40 INJECTION, SUSPENSION INTRA-ARTICULAR; INTRAMUSCULAR at 14:30

## 2025-03-14 RX ADMIN — LIDOCAINE HYDROCHLORIDE 1 ML: 10 INJECTION, SOLUTION INFILTRATION; PERINEURAL at 14:30

## 2025-03-14 NOTE — PROGRESS NOTES
"Chief Complaint  Follow-up of the Right Hand     Subjective      Estelle Gutierrez presents to [facility] for follow up of the right hand.  She has pain at the base of her thumb.  She has difficulty with  and FMC tasks. She has had no recent injury.     Allergies   Allergen Reactions    Shellfish-Derived Products Unknown - Low Severity    Macrobid [Nitrofurantoin] Rash    Other Rash     Fertilizer on farm        Social History     Socioeconomic History    Marital status:    Tobacco Use    Smoking status: Former     Current packs/day: 0.00     Average packs/day: 1 pack/day for 36.0 years (36.0 ttl pk-yrs)     Types: Cigarettes     Start date:      Quit date:      Years since quittin.2     Passive exposure: Never    Smokeless tobacco: Never   Vaping Use    Vaping status: Never Used   Substance and Sexual Activity    Alcohol use: Not Currently    Drug use: Never    Sexual activity: Not Currently     Partners: Male        I reviewed the patient's chief complaint, history of present illness, review of systems, past medical history, surgical history, family history, social history, medications, and allergy list.     Review of Systems     Constitutional: Denies fevers, chills, weight loss  Cardiovascular: Denies chest pain, shortness of breath  Skin: Denies rashes, acute skin changes  Neurologic: Denies headache, loss of consciousness        Vital Signs:   /76   Pulse 73   Ht 157.5 cm (62.01\")   Wt 78 kg (172 lb)   SpO2 96%   BMI 31.45 kg/m²          Physical Exam  General: Alert. No acute distress    Ortho Exam        RIGHT HAND Negative Compression testing/ Negative Tinels. NegativeFinkelsteins. Negative Odonnell's testing. Positive CMC grind testing. Negative Phalens. Full ROM of the hand, fingers, elbow and wrist. Negative Triggering of the digit. Sensation grossly intact to light touch, median, radial and ulnar nerve. Positive AIN, PIN and ulnar nerve motor " function intact. Axillary nerve intact. Positive pulses.        Small Joint: R thumb CMC  Consent given by: patient  Site marked: site marked  Timeout: Immediately prior to procedure a time out was called to verify the correct patient, procedure, equipment, support staff and site/side marked as required   Supporting Documentation  Indications: pain   Procedure Details  Location: thumb - R thumb CMC  Preparation: Patient was prepped and draped in the usual sterile fashion  Needle gauge: 23g.  Medications administered: 1 mL lidocaine 1 %; 40 mg triamcinolone acetonide 40 MG/ML  Patient tolerance: patient tolerated the procedure well with no immediate complications    This injection documentation was Scribed for Oleksandr Presley MD by Liliane Nelson MA.  03/14/25   15:11 EDT        Imaging Results (Most Recent)       None             Result Review :             Assessment and Plan     Diagnoses and all orders for this visit:    1. Right hand pain (Primary)    2. Arthritis of carpometacarpal (CMC) joint of right thumb        Discussed the treatment plan with the patient.     Discussed the risks and benefits of conservative measures. The patient expressed understanding and wished to proceed with a right thumb CMC injection.  She tolerated the injection well.     Discussed with the patient that due to the steroid injection given today in the office they may see an increase in blood sugar for a few days. Advised patient to monitor sugar after receiving the injection.      Discussed possibility of a reaction from the injection.  Discussed the possibility that the injection may not completely improve or remove the pain.  Discussed the risk of infection.        Call or return if worsening symptoms.    Follow Up     PRN      Patient was given instructions and counseling regarding her condition or for health maintenance advice. Please see specific information pulled into the AVS if appropriate.     Scribed for Oleksandr PEDROZA  MD Fernandez by Latonia Avila MA.  03/14/25   14:54 EDT      I have personally performed the services described in this document as scribed by the above individual and it is both accurate and complete. Oleksandr Presley MD 03/14/25

## 2025-03-17 ENCOUNTER — OFFICE VISIT (OUTPATIENT)
Dept: ORTHOPEDIC SURGERY | Facility: CLINIC | Age: 80
End: 2025-03-17
Payer: COMMERCIAL

## 2025-03-17 VITALS
HEART RATE: 76 BPM | HEIGHT: 62 IN | DIASTOLIC BLOOD PRESSURE: 79 MMHG | SYSTOLIC BLOOD PRESSURE: 143 MMHG | OXYGEN SATURATION: 96 % | WEIGHT: 172 LBS | BODY MASS INDEX: 31.65 KG/M2

## 2025-03-17 DIAGNOSIS — M17.0 OSTEOARTHRITIS OF BOTH KNEES, UNSPECIFIED OSTEOARTHRITIS TYPE: ICD-10-CM

## 2025-03-17 DIAGNOSIS — M25.562 LEFT KNEE PAIN, UNSPECIFIED CHRONICITY: Primary | ICD-10-CM

## 2025-03-17 DIAGNOSIS — M25.561 RIGHT KNEE PAIN, UNSPECIFIED CHRONICITY: ICD-10-CM

## 2025-03-17 PROCEDURE — 20610 DRAIN/INJ JOINT/BURSA W/O US: CPT | Performed by: ORTHOPAEDIC SURGERY

## 2025-03-17 PROCEDURE — 99213 OFFICE O/P EST LOW 20 MIN: CPT | Performed by: ORTHOPAEDIC SURGERY

## 2025-03-17 RX ORDER — TRIAMCINOLONE ACETONIDE 40 MG/ML
40 INJECTION, SUSPENSION INTRA-ARTICULAR; INTRAMUSCULAR
Status: COMPLETED | OUTPATIENT
Start: 2025-03-17 | End: 2025-03-17

## 2025-03-17 RX ORDER — LIDOCAINE HYDROCHLORIDE 10 MG/ML
5 INJECTION, SOLUTION INFILTRATION; PERINEURAL
Status: COMPLETED | OUTPATIENT
Start: 2025-03-17 | End: 2025-03-17

## 2025-03-17 RX ADMIN — TRIAMCINOLONE ACETONIDE 40 MG: 40 INJECTION, SUSPENSION INTRA-ARTICULAR; INTRAMUSCULAR at 13:45

## 2025-03-17 RX ADMIN — LIDOCAINE HYDROCHLORIDE 5 ML: 10 INJECTION, SOLUTION INFILTRATION; PERINEURAL at 13:45

## 2025-03-17 NOTE — PROGRESS NOTES
"Chief Complaint  Initial Evaluation of the Right Knee and Initial Evaluation of the Left Knee     Subjective      Estelle Gutierrez presents to [facility] for initial evaluation of bilateral knees.  She had prior meniscus repair on the left knee in the past.   She has pain in bilateral knees.  The left knee is worse then the right.  She has pain with prolonged standing, ambulation and stairs.  She has had no recent injury or fall.      Allergies   Allergen Reactions    Shellfish-Derived Products Unknown - Low Severity    Macrobid [Nitrofurantoin] Rash    Other Rash     Fertilizer on farm        Social History     Socioeconomic History    Marital status:    Tobacco Use    Smoking status: Former     Current packs/day: 0.00     Average packs/day: 1 pack/day for 36.0 years (36.0 ttl pk-yrs)     Types: Cigarettes     Start date:      Quit date:      Years since quittin.2     Passive exposure: Never    Smokeless tobacco: Never   Vaping Use    Vaping status: Never Used   Substance and Sexual Activity    Alcohol use: Not Currently    Drug use: Never    Sexual activity: Not Currently     Partners: Male        I reviewed the patient's chief complaint, history of present illness, review of systems, past medical history, surgical history, family history, social history, medications, and allergy list.     Review of Systems     Constitutional: Denies fevers, chills, weight loss  Cardiovascular: Denies chest pain, shortness of breath  Skin: Denies rashes, acute skin changes  Neurologic: Denies headache, loss of consciousness        Vital Signs:   /79   Pulse 76   Ht 157.5 cm (62\")   Wt 78 kg (172 lb)   SpO2 96%   BMI 31.46 kg/m²          Physical Exam  General: Alert. No acute distress    Ortho Exam        BILATERAL KNEES Flexion 105. Extension -3. Stable to varus/valgus stress. Stable to anterior/posterior drawer. Neurovascularly intact. Negative Hari. Negative Lachman. " Positive EHL, FHL, HS and TA. Sensation intact to light touch all 5 nerves of the foot. Ambulates with Antalgic gait. Patella is well tracking. Calf supple, non-tender. Positive tenderness to the medial joint line. Positive tenderness to the lateral joint line. Positive Crepitus. Good strength to hamstrings, quadriceps, dorsiflexors, and plantar flexors.  Knee Extensor Mechanism intact Mild swelling.         Large Joint Arthrocentesis: R knee  Date/Time: 3/17/2025 1:45 PM  Consent given by: patient  Site marked: site marked  Timeout: Immediately prior to procedure a time out was called to verify the correct patient, procedure, equipment, support staff and site/side marked as required   Supporting Documentation  Indications: pain   Procedure Details  Location: knee - R knee  Needle gauge: 21 G.  Medications administered: 5 mL lidocaine 1 %; 40 mg triamcinolone acetonide 40 MG/ML  Patient tolerance: patient tolerated the procedure well with no immediate complications      Large Joint Arthrocentesis: L knee  Date/Time: 3/17/2025 1:45 PM  Consent given by: patient  Site marked: site marked  Timeout: Immediately prior to procedure a time out was called to verify the correct patient, procedure, equipment, support staff and site/side marked as required   Supporting Documentation  Indications: pain   Procedure Details  Location: knee - L knee  Needle gauge: 21 G.  Medications administered: 5 mL lidocaine 1 %; 40 mg triamcinolone acetonide 40 MG/ML  Patient tolerance: patient tolerated the procedure well with no immediate complications      This injection documentation was Scribed for Oleksandr Presley MD by William Mckinnon.  03/17/25   13:45 EDT      Imaging Results (Most Recent)       Procedure Component Value Units Date/Time    XR Knee 3 View Right [[id number]] Resulted: 03/17/25 1320     Updated: 03/17/25 1333    XR Knee 3 View Left [[id number]] Resulted: 03/17/25 1320     Updated: 03/17/25 1333             Result Review :      X-Ray Report:  Right knee X-Ray  Indication: Evaluation of the right knee  AP/Lateral and [place] view(s)  Findings: Advanced degenerative end stage bone on bone arthritis.    Prior studies available for comparison: yes     X-Ray Report:  Left knee X-Ray  Indication: Evaluation of the left knee  AP/Lateral and [place] view(s)  Findings: Advanced degenerative end stage bone on bone arthritis.    Prior studies available for comparison: yes             Assessment and Plan     Diagnoses and all orders for this visit:    1. Left knee pain, unspecified chronicity (Primary)  -     XR Knee 3 View Left    2. Right knee pain, unspecified chronicity  -     XR Knee 3 View Right    3. Osteoarthritis of both knees, unspecified osteoarthritis type        Discussed the treatment plan with the patient. I reviewed the X-rays that were obtained today with the patient.     Discussed the treatment options with the patient, operative vs non-operative. The patient is a candidate for total knee arthroplasty of either knee whenever she is ready.      The patient expressed understanding and wished to proceed with bilateral knee steroid injections.  She tolerated the injection well.        Call or return if worsening symptoms.    Follow Up     PRN      Patient was given instructions and counseling regarding her condition or for health maintenance advice. Please see specific information pulled into the AVS if appropriate.     Scribed for Oleksandr Persley MD by Latonia Avila MA.  03/17/25   13:02 EDT    I have personally performed the services described in this document as scribed by the above individual and it is both accurate and complete. Oleksandr Presley MD 03/17/25

## 2025-04-03 PROCEDURE — 87086 URINE CULTURE/COLONY COUNT: CPT | Performed by: PHYSICIAN ASSISTANT

## 2025-04-03 PROCEDURE — 87088 URINE BACTERIA CULTURE: CPT | Performed by: PHYSICIAN ASSISTANT

## 2025-04-03 PROCEDURE — 87186 SC STD MICRODIL/AGAR DIL: CPT | Performed by: PHYSICIAN ASSISTANT

## 2025-05-09 ENCOUNTER — OFFICE VISIT (OUTPATIENT)
Dept: FAMILY MEDICINE CLINIC | Facility: CLINIC | Age: 80
End: 2025-05-09
Payer: MEDICARE

## 2025-05-09 VITALS
BODY MASS INDEX: 31.53 KG/M2 | TEMPERATURE: 97.5 F | WEIGHT: 172.4 LBS | OXYGEN SATURATION: 98 % | DIASTOLIC BLOOD PRESSURE: 78 MMHG | SYSTOLIC BLOOD PRESSURE: 158 MMHG | HEART RATE: 79 BPM

## 2025-05-09 DIAGNOSIS — I10 PRIMARY HYPERTENSION: ICD-10-CM

## 2025-05-09 DIAGNOSIS — J01.00 ACUTE NON-RECURRENT MAXILLARY SINUSITIS: Primary | ICD-10-CM

## 2025-05-09 RX ORDER — AMOXICILLIN 875 MG/1
875 TABLET, COATED ORAL 2 TIMES DAILY
Qty: 20 TABLET | Refills: 0 | Status: SHIPPED | OUTPATIENT
Start: 2025-05-09 | End: 2025-05-19

## 2025-05-09 NOTE — PROGRESS NOTES
Chief Complaint  Facial Pain (Sinus on left side)    Patient or patient representative verbalized consent for the use of Ambient Listening during the visit with  CECILIA Luther for chart documentation. 5/9/2025  14:41 EDT    Subjective            Estelle Gutierrez is a 79 y.o. female who presents to [facility]   History of Present Illness  History of Present Illness  The patient presents for a sick visit.    She reports experiencing sinus pain and pressure localized under her left eye, which began yesterday. Accompanying these symptoms are headaches, a cold, and a deep cough that has recently become productive. The cough has been present for a couple of weeks. She has been self-medicating with Coricidin.    Her blood pressure is typically within normal range, although she does not monitor it at home.  She is on losartan 25 mg and amlodipine 5 mg daily.  She has previously used ibuprofen for symptom management.      Tobacco Use: Medium Risk (5/9/2025)    Patient History     Smoking Tobacco Use: Former     Smokeless Tobacco Use: Never     Passive Exposure: Never      E-cigarette/Vaping    E-cigarette/Vaping Use Never User      E-cigarette/Vaping Substances     E-cigarette/Vaping Devices       Alcohol Use: Not on file         Objective   Vital Signs:   Vitals:    05/09/25 1428 05/09/25 1435   BP: 173/75 158/78   Pulse: 79    Temp: 97.5 °F (36.4 °C)    SpO2: 98%    Weight: 78.2 kg (172 lb 6.4 oz)      Body mass index is 31.53 kg/m².    Wt Readings from Last 3 Encounters:   05/09/25 78.2 kg (172 lb 6.4 oz)   04/03/25 78 kg (171 lb 15.3 oz)   03/17/25 78 kg (172 lb)     BP Readings from Last 3 Encounters:   05/09/25 158/78   04/03/25 154/69   03/17/25 143/79       Health Maintenance   Topic Date Due    TDAP/TD VACCINES (1 - Tdap) Never done    Pneumococcal Vaccine 50+ (1 of 1 - PCV) Never done    ZOSTER VACCINE (1 of 2) Never done    RSV Vaccine - Adults (1 - 1-dose 75+ series)  Never done    DXA SCAN  05/26/2024    COVID-19 Vaccine (5 - 2024-25 season) 05/23/2025 (Originally 9/1/2024)    INFLUENZA VACCINE  07/01/2025    ANNUAL WELLNESS VISIT  03/03/2026    LIPID PANEL  03/03/2026    COLORECTAL CANCER SCREENING  07/28/2032    HEPATITIS C SCREENING  Completed       /78   Pulse 79   Temp 97.5 °F (36.4 °C)   Wt 78.2 kg (172 lb 6.4 oz)   SpO2 98%   BMI 31.53 kg/m²       Current Outpatient Medications:     amLODIPine (Norvasc) 5 MG tablet, Take 1 tablet by mouth Daily., Disp: 90 tablet, Rfl: 1    atorvastatin (Lipitor) 20 MG tablet, Take 1 tablet by mouth Daily., Disp: 90 tablet, Rfl: 1    carboxymethylcellulose (REFRESH PLUS) 0.5 % solution, Administer 1 drop to both eyes 3 (Three) Times a Day As Needed for Dry Eyes., Disp: , Rfl:     hydroCHLOROthiazide 12.5 MG tablet, TAKE ONE TABLET BY MOUTH IN THE MORNING AND 1 TABLET AT 1pm, Disp: 180 tablet, Rfl: 1    losartan (COZAAR) 25 MG tablet, Take 1 tablet by mouth Daily., Disp: 90 tablet, Rfl: 1    amoxicillin (AMOXIL) 875 MG tablet, Take 1 tablet by mouth 2 (Two) Times a Day for 10 days., Disp: 20 tablet, Rfl: 0   Past Medical History:   Diagnosis Date    Arthritis     Cancer     Cataract 01/15/2022    right    Hypertension         Physical Exam  Vitals reviewed.   Constitutional:       Appearance: Normal appearance. She is well-developed. She is obese.   HENT:      Right Ear: Tympanic membrane, ear canal and external ear normal.      Left Ear: Tympanic membrane, ear canal and external ear normal.      Nose:      Right Sinus: No maxillary sinus tenderness or frontal sinus tenderness.      Left Sinus: Maxillary sinus tenderness present. No frontal sinus tenderness.      Mouth/Throat:      Mouth: Mucous membranes are moist.      Pharynx: No pharyngeal swelling, oropharyngeal exudate or posterior oropharyngeal erythema.   Neck:      Thyroid: No thyroid mass, thyromegaly or thyroid tenderness.   Cardiovascular:      Rate and Rhythm: Normal  rate and regular rhythm.      Heart sounds: No murmur heard.     No friction rub. No gallop.   Pulmonary:      Effort: Pulmonary effort is normal.      Breath sounds: Normal breath sounds. No wheezing or rhonchi.   Lymphadenopathy:      Cervical: No cervical adenopathy.   Skin:     General: Skin is warm and dry.   Neurological:      Mental Status: She is alert and oriented to person, place, and time.      Cranial Nerves: No cranial nerve deficit.   Psychiatric:         Mood and Affect: Mood and affect normal.         Behavior: Behavior normal.         Thought Content: Thought content normal. Thought content does not include homicidal or suicidal ideation.         Judgment: Judgment normal.       Physical Exam        Result Review :    The following data was reviewed by: CECILIA Luther on 05/09/2025:           No Images in the past 120 days found.    Results      Assessment & Plan  Acute non-recurrent maxillary sinusitis    Orders:    amoxicillin (AMOXIL) 875 MG tablet; Take 1 tablet by mouth 2 (Two) Times a Day for 10 days.    Primary hypertension      Assessment & Plan  1. Sinusitis.  - Reports sinus pain and pressure on the left side under her left eye, which started yesterday.  - Has experienced headaches and a deep cough that has been present for a couple of weeks.  - Examination reveals clear lung sounds, suggesting a sinus infection.  - Amoxicillin 875 mg will be taken twice daily for 10 days. Advised to use Tylenol as needed for pain relief and may alternate with ibuprofen if necessary.    2. Elevated blood pressure.  - Blood pressure is elevated during this visit, which may be due to her current illness.  - Normally has good blood pressure and checks it at home.  - Advised to monitor blood pressure at home and keep an eye on it.  - If blood pressure remains high, follow up for potential medication adjustment.         Diagnosis Plan   1. Acute non-recurrent maxillary sinusitis  amoxicillin (AMOXIL)  875 MG tablet      2. Primary hypertension              FOLLOW UP  Return if symptoms worsen or fail to improve.  Patient was given instructions and counseling regarding her condition or for health maintenance advice. Please see specific information pulled into the AVS if appropriate.       CURRENT & DISCONTINUED MEDICATIONS  Current Outpatient Medications   Medication Instructions    amLODIPine (NORVASC) 5 mg, Oral, Daily    amoxicillin (AMOXIL) 875 mg, Oral, 2 Times Daily    atorvastatin (LIPITOR) 20 mg, Oral, Daily    carboxymethylcellulose (REFRESH PLUS) 0.5 % solution 1 drop, 3 Times Daily PRN    hydroCHLOROthiazide 12.5 MG tablet TAKE ONE TABLET BY MOUTH IN THE MORNING AND 1 TABLET AT 1pm    losartan (COZAAR) 25 mg, Oral, Daily       There are no discontinued medications.     Parts of this note are electronic transcriptions/translations of spoken language to printed text using the Dragon Dictation system.    Ying Eugene, CECILIA  05/09/25  16:43 EDT

## 2025-05-23 ENCOUNTER — TELEPHONE (OUTPATIENT)
Dept: ORTHOPEDIC SURGERY | Facility: CLINIC | Age: 80
End: 2025-05-23

## 2025-05-23 NOTE — TELEPHONE ENCOUNTER
Caller: Estelle Gutierrez    Relationship to patient: Self    Best call back number:[phone]      Chief complaint: BILATERAL KNEE    Type of visit: GEL INJECTION     Requested date: NA      If rescheduling, when is the original appointment: 06/18/2025     Additional notes:PATIENT WANTS TO GET GEL INJECTION INSTEAD OF STEROID. PATIENT CURRENTLY IS SCHEDULED FOR 06/18/2025 AT 3.00 PM. PATIENT HAS MEDICARE A &B. SHE WILL NOT HAVE ANTHEM BLUE CROSS. IT EXPIRES 05/31/2025        PATIENT STATES OKAY TO LEAVE VM

## 2025-06-09 ENCOUNTER — OFFICE VISIT (OUTPATIENT)
Dept: ORTHOPEDIC SURGERY | Facility: CLINIC | Age: 80
End: 2025-06-09
Payer: MEDICARE

## 2025-06-09 VITALS
DIASTOLIC BLOOD PRESSURE: 76 MMHG | BODY MASS INDEX: 31.65 KG/M2 | SYSTOLIC BLOOD PRESSURE: 133 MMHG | OXYGEN SATURATION: 95 % | HEART RATE: 80 BPM | WEIGHT: 172 LBS | HEIGHT: 62 IN

## 2025-06-09 DIAGNOSIS — M25.562 LEFT KNEE PAIN, UNSPECIFIED CHRONICITY: ICD-10-CM

## 2025-06-09 DIAGNOSIS — M25.561 RIGHT KNEE PAIN, UNSPECIFIED CHRONICITY: ICD-10-CM

## 2025-06-09 DIAGNOSIS — M17.0 OSTEOARTHRITIS OF BOTH KNEES, UNSPECIFIED OSTEOARTHRITIS TYPE: Primary | ICD-10-CM

## 2025-06-09 NOTE — PROGRESS NOTES
"Chief Complaint  Follow-up of the Right Knee and Follow-up of the Left Knee     Subjective      Estelle Gutierrez presents to [facility] for follow up of bilateral knees.  She had injections on 3/17/25 of steroid.  She had prior meniscus repair on the left knee in the past. She has pain in bilateral knees. The left knee is worse then the right. She has pain with prolonged standing, ambulation and stairs. She has had no recent injury or fall.     Allergies   Allergen Reactions    Shellfish-Derived Products Unknown - Low Severity    Macrobid [Nitrofurantoin] Rash    Other Rash     Fertilizer on farm        Social History     Socioeconomic History    Marital status:    Tobacco Use    Smoking status: Former     Current packs/day: 0.00     Average packs/day: 1 pack/day for 36.0 years (36.0 ttl pk-yrs)     Types: Cigarettes     Start date:      Quit date:      Years since quittin.4     Passive exposure: Never    Smokeless tobacco: Never   Vaping Use    Vaping status: Never Used   Substance and Sexual Activity    Alcohol use: Not Currently    Drug use: Never    Sexual activity: Not Currently     Partners: Male        I reviewed the patient's chief complaint, history of present illness, review of systems, past medical history, surgical history, family history, social history, medications, and allergy list.     Review of Systems     Constitutional: Denies fevers, chills, weight loss  Cardiovascular: Denies chest pain, shortness of breath  Skin: Denies rashes, acute skin changes  Neurologic: Denies headache, loss of consciousness        Vital Signs:   /76   Pulse 80   Ht 157.5 cm (62\")   Wt 78 kg (172 lb)   SpO2 95%   BMI 31.46 kg/m²          Physical Exam  General: Alert. No acute distress    Ortho Exam        BILATERAL KNEES Flexion 105. Extension -3. Stable to varus/valgus stress. Stable to anterior/posterior drawer.  Positive EHL, FHL, HS and TA. Sensation intact to " light touch all 5 nerves of the foot. Ambulates with Antalgic gait. Patella is well tracking. Calf supple, non-tender.   Knee Extensor Mechanism intact Mild swelling.       Large Joint Arthrocentesis: R knee  Date/Time: 6/9/2025 3:34 PM  Consent given by: patient  Site marked: site marked  Timeout: Immediately prior to procedure a time out was called to verify the correct patient, procedure, equipment, support staff and site/side marked as required   Supporting Documentation  Indications: pain   Procedure Details  Location: knee - R knee  Needle gauge: 21 G.  Medications administered: 48 mg hylan 48 MG/6ML  Patient tolerance: patient tolerated the procedure well with no immediate complications      Large Joint Arthrocentesis: L knee  Date/Time: 6/9/2025 3:35 PM  Consent given by: patient  Site marked: site marked  Timeout: Immediately prior to procedure a time out was called to verify the correct patient, procedure, equipment, support staff and site/side marked as required   Supporting Documentation  Indications: pain   Procedure Details  Location: knee - L knee  Needle gauge: 21 G.  Medications administered: 48 mg hylan 48 MG/6ML  Patient tolerance: patient tolerated the procedure well with no immediate complications      This injection documentation was Scribed for Oleksandr Presley MD by William Mckinnon.  06/09/25   15:35 EDT      Imaging Results (Most Recent)       None             Result Review :             Assessment and Plan     Diagnoses and all orders for this visit:    1. Osteoarthritis of both knees, unspecified osteoarthritis type (Primary)    2. Right knee pain, unspecified chronicity    3. Left knee pain, unspecified chronicity        Discussed the treatment plan with the patient.     Discussed the risks and benefits of conservative measures. The patient expressed understanding and wished to proceed with bilateral knee Synvisc injections.  She tolerated the injection well.      Discussed possibility of a  reaction from the injection.  Discussed the possibility that the injection may not completely improve or remove the pain.  Discussed the risk of infection.    Call or return if worsening symptoms.    Follow Up     PRN      Patient was given instructions and counseling regarding her condition or for health maintenance advice. Please see specific information pulled into the AVS if appropriate.     Scribed for Oleksandr Presley MD by Latonia Avila MA.  06/09/25   15:25 EDT    I have personally performed the services described in this document as scribed by the above individual and it is both accurate and complete. Oleksandr Presley MD 06/09/25

## 2025-08-18 PROCEDURE — 87086 URINE CULTURE/COLONY COUNT: CPT | Performed by: PHYSICIAN ASSISTANT

## (undated) DEVICE — GLV SURG SENSICARE PI LF PF 7.5 GRN STRL

## (undated) DEVICE — TUBING, SUCTION, 1/4" X 10', STRAIGHT: Brand: MEDLINE

## (undated) DEVICE — COLON KIT: Brand: MEDLINE INDUSTRIES, INC.

## (undated) DEVICE — Device: Brand: DEFENDO AIR/WATER/SUCTION AND BIOPSY VALVE

## (undated) DEVICE — SOL IRRG H2O PL/BG 1000ML STRL

## (undated) DEVICE — STERILE POLYISOPRENE POWDER-FREE SURGICAL GLOVES: Brand: PROTEXIS

## (undated) DEVICE — SOL IRR H2O BTL 1000ML STRL